# Patient Record
Sex: FEMALE | Race: WHITE | NOT HISPANIC OR LATINO | Employment: OTHER | URBAN - NONMETROPOLITAN AREA
[De-identification: names, ages, dates, MRNs, and addresses within clinical notes are randomized per-mention and may not be internally consistent; named-entity substitution may affect disease eponyms.]

---

## 2017-12-15 ENCOUNTER — HISTORY (OUTPATIENT)
Dept: EMERGENCY MEDICINE | Facility: OTHER | Age: 52
End: 2017-12-15

## 2017-12-15 ENCOUNTER — SURGERY (OUTPATIENT)
Dept: SURGERY | Facility: OTHER | Age: 52
End: 2017-12-15

## 2017-12-15 ENCOUNTER — HOSPITAL ENCOUNTER (OUTPATIENT)
Dept: MEDSURG UNIT | Facility: OTHER | Age: 52
Discharge: HOME | End: 2017-12-16

## 2017-12-15 DIAGNOSIS — K36 OTHER APPENDICITIS: ICD-10-CM

## 2017-12-15 LAB
A/G RATIO - HISTORICAL: 1.2 (ref 1–2)
ABSOLUTE BASOPHILS - HISTORICAL: 0 THOU/CU MM
ABSOLUTE EOSINOPHILS - HISTORICAL: 0.1 THOU/CU MM
ABSOLUTE IMMATURE GRANULOCYTES(METAS,MYELOS,PROS) - HISTORICAL: 0 THOU/CU MM
ABSOLUTE LYMPHOCYTES - HISTORICAL: 0.9 THOU/CU MM (ref 0.9–2.9)
ABSOLUTE MONOCYTES - HISTORICAL: 0.6 THOU/CU MM
ABSOLUTE NEUTROPHILS - HISTORICAL: 6.7 THOU/CU MM (ref 1.7–7)
ALBUMIN SERPL-MCNC: 4.1 G/DL (ref 3.5–5.7)
ALP SERPL-CCNC: 105 IU/L (ref 34–104)
ALT (SGPT) - HISTORICAL: 64 IU/L (ref 7–52)
ANION GAP - HISTORICAL: 8 (ref 5–18)
AST SERPL-CCNC: 33 IU/L (ref 13–39)
BASOPHILS # BLD AUTO: 0.4 %
BILIRUB SERPL-MCNC: 0.4 MG/DL (ref 0.3–1)
BILIRUB UR QL: NEGATIVE
BUN SERPL-MCNC: 9 MG/DL (ref 7–25)
BUN/CREAT RATIO - HISTORICAL: 9
CALCIUM SERPL-MCNC: 9.2 MG/DL (ref 8.6–10.3)
CHLORIDE SERPLBLD-SCNC: 103 MMOL/L (ref 98–107)
CLARITY, URINE: CLEAR CLARITY
CO2 SERPL-SCNC: 27 MMOL/L (ref 21–31)
COLOR UR: YELLOW COLOR
CREAT SERPL-MCNC: 0.98 MG/DL (ref 0.7–1.3)
EOSINOPHIL NFR BLD AUTO: 1.1 %
ERYTHROCYTE [DISTWIDTH] IN BLOOD BY AUTOMATED COUNT: 13.8 % (ref 11.5–15.5)
GFR IF NOT AFRICAN AMERICAN - HISTORICAL: 59 ML/MIN/1.73M2
GLOBULIN - HISTORICAL: 3.5 G/DL (ref 2–3.7)
GLUCOSE SERPL-MCNC: 114 MG/DL (ref 70–105)
GLUCOSE URINE: NEGATIVE MG/DL
HCG UR QL: NEGATIVE
HCT VFR BLD AUTO: 42.5 % (ref 33–51)
HEMOGLOBIN: 13.9 G/DL (ref 12–16)
IMMATURE GRANULOCYTES(METAS,MYELOS,PROS) - HISTORICAL: 0.2 %
KETONES UR QL: NEGATIVE MG/DL
LEUKOCYTE ESTERASE URINE: NEGATIVE
LIPASE SERPL-CCNC: 27.7 IU/L (ref 11–82)
LYMPHOCYTES NFR BLD AUTO: 10.7 % (ref 20–44)
MCH RBC QN AUTO: 29.5 PG (ref 26–34)
MCHC RBC AUTO-ENTMCNC: 32.7 G/DL (ref 32–36)
MCV RBC AUTO: 90 FL (ref 80–100)
MONOCYTES NFR BLD AUTO: 7.4 %
NEUTROPHILS NFR BLD AUTO: 80.2 % (ref 42–72)
NITRITE UR QL STRIP: NEGATIVE
OCCULT BLOOD,URINE - HISTORICAL: NEGATIVE
PH UR: 8 [PH]
PLATELET # BLD AUTO: 232 THOU/CU MM (ref 140–440)
PMV BLD: 9.9 FL (ref 6.5–11)
POTASSIUM SERPL-SCNC: 4.1 MMOL/L (ref 3.5–5.1)
PROT SERPL-MCNC: 7.6 G/DL (ref 6.4–8.9)
PROTEIN QUALITATIVE,URINE - HISTORICAL: NEGATIVE MG/DL
RED BLOOD COUNT - HISTORICAL: 4.71 MIL/CU MM (ref 4–5.2)
SODIUM SERPL-SCNC: 138 MMOL/L (ref 133–143)
SP GR UR STRIP: 1.01
UROBILINOGEN,QUALITATIVE - HISTORICAL: NORMAL EU/DL
WHITE BLOOD COUNT - HISTORICAL: 8.3 THOU/CU MM (ref 4.5–11)

## 2017-12-27 ENCOUNTER — COMMUNICATION - GICH (OUTPATIENT)
Dept: SURGERY | Facility: OTHER | Age: 52
End: 2017-12-27

## 2018-02-12 NOTE — TELEPHONE ENCOUNTER
"Patient Information     Patient Name MRN Sex Ally Bustillos [id number] Female 1965      Telephone Encounter by Anjel Gonzalez at 2017  1:27 PM     Author:  Anjel Gonzalez Service:  (none) Author Type:  (none)     Filed:  2017  1:30 PM Encounter Date:  2017 Status:  Signed     :  Anjel Gonzalez            Please notify patient that her pathology report stated: \"Appendicitis\". Per surgeons request, if patient was seen by her primary provider and is having no further complications, she does not need to come back in for a post-op.     Anjel Gonzalez ....................  2017   1:29 PM          "

## 2018-02-12 NOTE — ED NOTES
Patient Information     Patient Name MRN Sex Ally Bustillos [id number] Female 1965      ED Nursing Note by Jose Angel Pandey RN at 12/15/2017  4:35 PM     Author:  Jose Angel Pandey RN Service:  (none) Author Type:  NURS- Registered Nurse     Filed:  12/15/2017  5:16 PM Date of Service:  12/15/2017  4:35 PM Status:  Signed     :  Jose Angel Pandey RN (NURS- Registered Nurse)            Resting in room, provider in to see patient.

## 2018-02-12 NOTE — ED NOTES
"Patient Information     Patient Name MRN Ally Wilkins [id number] Female 1965      ED Nursing Note by Jose Angel Pandey RN at 12/15/2017  3:41 PM     Author:  Jose Angel Pandey RN Service:  (none) Author Type:  NURS- Registered Nurse     Filed:  12/15/2017  3:43 PM Date of Service:  12/15/2017  3:41 PM Status:  Signed     :  Jose Angel Pandey RN (NURS- Registered Nurse)            States this morning while getting ready for her [facility], states her \"nerve pain\" started really bad, pointing to right groin. Then the pain became worse as the day went on. Had an episode of chills this afternoon. States she is on disability from her \"nerve pain\".         "

## 2018-02-12 NOTE — ED PROVIDER NOTES
Patient Information     Patient Name MRN Sex Ally Bustillos [id number] Female 1965      ED Provider Note by Munir Hernández MD at 12/15/2017  7:21 PM     Author:  Munir Hernández MD Service:  (none) Author Type:  Physician     Filed:  12/15/2017  7:23 PM Date of Service:  12/15/2017  7:21 PM Status:  Signed     :  Munir Hernández MD (Physician)            PATIENT:  Ally Rodas       52 y.o.       female       MRN #:  [id number]    CHIEF COMPLAINT:  Pain In Side and Musculoskeletal Problem      HPI 52y with right middle and upper quadrant pain for 2 days, getting worse.  Eating seems to make it worse.      No fevers, although she has a measured temp here in the ER.    No past surgical abdominal hx that she reports.      No nausea, vomiting, diarrhea.    No urinary symptoms.    ALLERGIES:  Review of patient's allergies indicates no known allergies.    CURRENT MEDICATIONS:    No current outpatient prescriptions on file.  PROBLEM LIST:       DEPRESSION, HX OF      FATIGUE      DEPRESSION      OSTEOPENIA      PELVIC  PAIN        PAST MEDICAL HISTORY:    Past Medical History:     Diagnosis  Date     Broken shoulder      Fracture of toe      Fracture of wrist      History of pregnancy      PAST SURGICAL HISTORY:    Past Surgical History:      Procedure  Laterality Date     [place] teeth extraction       FAMILY HISTORY:    Family History       Problem   Relation Age of Onset     Cancer-colon  Father      Heart Disease  Father      CABG       Diabetes  Paternal Aunt      X 2       Psychiatric illness  Brother      depression       Thyroid Disease  No Family History      SOCIAL HISTORY:  Marital Status:   [2]  Employment Status:  Not Employed [3]   Occupation:    Substance Use:  Smoking status: Never Smoker                                                              Smokeless status: Never Used                           Review of Systems no fevers, chills, HEENT,  "SOB, chest pain    Patient Vitals for the past 24 hrs:   BP Temp Pulse Resp SpO2 Height Weight   12/15/17 1700 (!) 143/110 - - - 99 % - -   12/15/17 1645 135/89 - - - 96 % - -   12/15/17 1543 (!) 143/103 99.9  F (37.7  C) (!) 106 18 97 % 1.651 m (5' 5\") 136.1 kg (300 lb)      Physical Exam   Well woman with stable vitals.  OP clear.  Heart reg.  Lungs clear.  Abd tender in right middle and upper quadrant.  Equivocal McBurney's point.  Otherwise no surgical signs like involuntary guarding or rebound.  NABS.  ECG:      IMAGING:  Acute appendicitis    LABORATORY:  reassuring    ED COURSE:  ED Course     She is given dilaudid 0.5mg IV x 2    IMPRESSION and PLAN:  Will admit under 's service for operative and post-operative care.      ENCOUNTER DIAGNOSES:  ENCOUNTER DIAGNOSES        ICD-10-CM    1. Other appendicitis K36        Coding    Munir Hernández MD  DOS: 12/15/2017   [facility]          "

## 2018-02-12 NOTE — OR ANESTHESIA
Patient Information     Patient Name MRN Sex     Ally Rodas [id number] Female 1965      OR Anesthesia by Regino Javed CRNA at 12/15/2017  7:32 PM     Author:  Regino Javed CRNA Service:  (none) Author Type:  NURS- Nurse Anesthetist     Filed:  12/15/2017  7:32 PM Date of Service:  12/15/2017  7:32 PM Status:  Signed     :  Regino Javed CRNA (NURS- Nurse Anesthetist)                                                           ANESTHESIA ASSESSMENT    Date: 12/15/17 Time: 7:32 PM      Patient:  Ally Rodas    * No surgery found *    Past Medical History:     Diagnosis  Date     Broken shoulder     left      Fracture of toe      Fracture of wrist     left      History of pregnancy      2, para 2 (vaginal childbirths)        Past Surgical History:      Procedure  Laterality Date     [place] TEETH EXTRACTION         Family History       Problem   Relation Age of Onset     Cancer-colon  Father      Heart Disease  Father      CABG       Diabetes  Paternal Aunt      X 2       Psychiatric illness  Brother      depression       Thyroid Disease  No Family History        Patient Active Problem List     Diagnosis  Code     FATIGUE R53.81, R53.83     DEPRESSION F32.9     OSTEOPENIA M89.9, M94.9     PELVIC  PAIN R10.9     DEPRESSION, HX OF Z86.59         (Not in a hospital admission)    Allergies:No Known Allergies    Review of Systems:  GERD: No  Chest pain: No  Shortness of breath: No  Recent fever: Yes (started today)  Poor exercise tolerance: No  Bleeding tendency: No  Pregnant: No  Anesthesia Complications: None      History    Smoking Status      Never Smoker   Smokeless Tobacco      Never Used     Social History     Social History        Marital status:       Spouse name: N/A     Number of children:  N/A     Years of education:  N/A     Social History Main Topics       Smoking status: Never Smoker     Smokeless tobacco: Never Used     Alcohol use None     Drug  "use: None     Sexual activity: Not Asked     Other Topics  Concern     None      Social History Narrative     Derek ~ 2 children       Physical Examination:  BP (!) 143/110  Pulse (!) 106  Temp 99.9  F (37.7  C)  Resp 18  Ht 1.651 m (5' 5\")  Wt 136.1 kg (300 lb)  SpO2 99%  BMI 49.92 kg/m2 Body mass index is 49.92 kg/(m^2). Body surface area is 2.5 meters squared.  Dental Condition: Good     Mallampati Score (Airway): I  Cardiovascular: Normal  Pulmonary: Normal  Other: (not recorded)    Recent Labs in [facility]:    Recent Labs        12/15/17   1633  12/15/17   1620   SODIUM   --   138   POTASSIUM   --   4.1   CHLORIDE   --   103   DH4IVMNY   --   27   ANIONGAP   --   8   BUN   --   9   CREATININE   --   0.98   BUNCREARATIO   --   9   CALCIUM   --   9.2   GLUCOSE   --   114 H   WBC   --   8.3   HGB   --   13.9   HCT   --   42.5   PLT   --   232   PREGURINE  Negative   --              Assessment/Plan:  ASA Class: II  Risk of dental injury discussed: Yes  NPO status confirmed: Yes (greater than 8 hrs)  Anesthetic Plan:  General  Risk/Benefit/Alt discussed: Yes  Questions answered: Yes  Emergency Case?: Yes  Labs/ECG/Radiology Reviewed?: Yes      H&P Reviewed.  Patient Examined.      Provider Electronic Signature:  Regino Javed CRNA                "

## 2018-02-12 NOTE — ED NOTES
Patient Information     Patient Name MRN Sex Ally Bustillos [id number] Female 1965      ED Nursing Note by Jose Angel Pandey RN at 12/15/2017  5:39 PM     Author:  Jose Angel Pandey RN Service:  (none) Author Type:  NURS- Registered Nurse     Filed:  12/15/2017  5:39 PM Date of Service:  12/15/2017  5:39 PM Status:  Signed     :  Jose Angel Pandey RN (NURS- Registered Nurse)            Pain level down to 2/10.

## 2018-02-12 NOTE — PROGRESS NOTES
Patient Information     Patient Name MRN Sex     Ally Rodas [id number] Female 1965      Progress Notes by Patricia Bagley RN at 12/15/2017 10:50 PM     Author:  Patricia Bagley RN Service:  (none) Author Type:  NURS- Registered Nurse     Filed:  12/15/2017 11:24 PM Date of Service:  12/15/2017 10:50 PM Status:  Signed     :  Patricia Bagley RN (NURS- Registered Nurse)            Admission Note    Data:  Ally Rodas admitted to [facility] from PACU via cart.      Action:  Family and Dr. Gonzalez have been notified of admission.      Response:  Patient tolerated transfer. and Patient is stable. Patient on 2L of oxygen. Reports 6/10 surgical site pain. Drowsy. Daughter, Connie, here during admission and will stay the night. Oriented patient to room, call light, and bed controls.  D/C goal: Have pain controlled.    Patricia Bagley RN ....................  12/15/2017   11:23 PM

## 2018-02-12 NOTE — ED NOTES
Patient Information     Patient Name MRN Sex Ally Bustillos [id number] Female 1965      ED Nursing Note by Jose Angel Pandey RN at 12/15/2017  4:04 PM     Author:  Jose Angel Pandey RN Service:  (none) Author Type:  NURS- Registered Nurse     Filed:  12/15/2017  4:07 PM Date of Service:  12/15/2017  4:04 PM Status:  Signed     :  Jose Angel Pandey RN (NURS- Registered Nurse)            ED Nursing Assessment  ________________________________    GENERAL APPEARANCE:   Anxious  EXTREMITIES/SKIN:   No apparent problem  NEUROLOGIC:   Orientation/LOC status: (A) alert and oriented to person, place and date/time  RESPIRATORY:   Breath Sounds are clear  CARDIAC:   No apparent problem  ABDOMEN/GI:   Contour of abdomen is soft, Bowel sounds are normal, Pain present in LUQ and RUQ, and tender on palpation  GENITOURINARY:   No problems  REPRODUCTIVE:   No apparent problem  MUSCULOSKELETAL:     EENT:

## 2018-02-12 NOTE — TELEPHONE ENCOUNTER
Patient Information     Patient Name MRN Sex Ally Bustillos [id number] Female 1965      Telephone Encounter by Anjel Gonzalez at 2017  1:29 PM     Author:  Ajnel Gonzalez Service:  (none) Author Type:  (none)     Filed:  2017  1:30 PM Encounter Date:  2017 Status:  Signed     :  Anjel Gonzalez            Left message to call back  ...................Anjel Gonzalez LPN....2017 1:29 PM

## 2018-02-12 NOTE — PROGRESS NOTES
Patient Information     Patient Name MRN Sex Ally Bustillos [id number] Female 1965      Progress Notes by Crys Kim RN at 2017  8:58 AM     Author:  Crys Kim RN Service:  (none) Author Type:  NURS- Registered Nurse     Filed:  2017  8:58 AM Date of Service:  2017  8:58 AM Status:  Signed     :  Crys Kim RN (NURS- Registered Nurse)            Patient tolerated soft surgical diet this morning. Independent in room. Voiding without difficulties. Crys Kim RN ....................  2017   8:58 AM

## 2018-02-12 NOTE — PROGRESS NOTES
Patient Information     Patient Name MRN Sex     Ally Rodas [id number] Female 1965      Progress Notes by Crys Kim RN at 2017  9:19 AM     Author:  Crys Kim RN Service:  (none) Author Type:  NURS- Registered Nurse     Filed:  2017  9:22 AM Date of Service:  2017  9:19 AM Status:  Signed     :  Crys Kim RN (NURS- Registered Nurse)            Discharge Note    Data:  Ally Rodas has been discharged home at 0915 via wheelchair accompanied by Nursing Assistant.      Action:  Written discharge/follow-up instructions were provided to patient. Prescriptions were written and sent with patient and were sent to patients pharmacy.  Belongings sent with patient. Medications from home sent with patient/family: Not Applicable  Equipment none .     Response:  Patient verbalized understanding of discharge instructions, reason for discharge, and necessary follow-up appointments.     Crys Kim RN ....................  2017   9:21 AM

## 2018-02-12 NOTE — OR POSTOP
Patient Information     Patient Name MRN Sex     Ally Rodas [id number] Female 1965      OR PostOp by Aletha Aguila RN at 12/15/2017 10:33 PM     Author:  Aletha Aguila RN Service:  (none) Author Type:  NURS- Registered Nurse     Filed:  12/15/2017 10:35 PM Date of Service:  12/15/2017 10:33 PM Status:  Signed     :  Aletha Aguila RN (NURS- Registered Nurse)            PACU Transfer Note    Ally Rodas transferred to [facility] via cart.  Equipment used for transport:  Oxygen, Nasal Cannula.  Accompanied by:  Registered Nurse. Report given to Patricia MEZA.    Patient stable and meets phase 1 discharge criteria for transport from PACU.  PACU Respiratory Event Documentation     1) Episodes of Apnea greater than or equal to 10 seconds: No    2) Bradypnea - less than 8 breaths per minute: No    3) Pain score on 0 to 10 scale: 0    4) Pain-sedation mismatch (yes or no): No    5) Repeated 02 desaturation less than 90% (yes or no): O2 on 2 l via NC.    Anesthesia notified? (yes or no): No    Any of the above events occuring repeatedly in separate 30 minute intervals may be considered recurrent PACU respiratory events.

## 2018-02-12 NOTE — ED NOTES
Patient Information     Patient Name MRN Sex Ally Bustillos [id number] Female 1965      ED Nursing Note by Jose Angel Pandey RN at 12/15/2017  6:15 PM     Author:  Jose Angel Pandey RN Service:  (none) Author Type:  NURS- Registered Nurse     Filed:  12/15/2017  8:09 PM Date of Service:  12/15/2017  6:15 PM Status:  Signed     :  Jose Angel Pandey RN (NURS- Registered Nurse)            Resting in bed, pain contrileed

## 2018-02-12 NOTE — OR SURGEON
Patient Information     Patient Name MRN Sex Ally Bustillos [id number] Female 1965      OR Surgeon by Cesar Gonzalez MD at 12/15/2017  9:32 PM     Author:  Cesar Gonzalez MD Service:  (none) Author Type:  Physician     Filed:  12/15/2017  9:37 PM Date of Service:  12/15/2017  9:32 PM Status:  Signed     :  Cesar Gonzalez MD (Physician)            Preoperative Diagnosis: Acute appendicitis     Postoperative Diagnosis: Acute appendicitis with localized peritonitis    Procedure planned: Laparoscopic appendectomy     Procedure performed: Laparoscopic appendectomy     Surgeon: Cesar Gonzalez MD    Assistant: Cristi Dyer CST  Circulator: Jackie Dai RN  PACU Nurse: Aletha Aguila RN  Scrub: Kenzie Caban  Scrub Orientee: Romina Cardenas, Surgical Technician    Anesthesia: General endotracheal with local    Specimen: appendix     Estimated Blood Loss: less than 10 ml     INDICATIONS   Please see H&P. The patient had acute onset of Right mid abdominal pain.  WBC is normal and CT scan showed changes consistent with acute appendicitis. The risks, benefits and alternatives to laparoscopic appendectomy for treating acute appendicitiis were discussed with the patient. We specifically discussed the risks of infection, bleeding, injury to abdominal organs and the possible need for open procedure. The patient expressed understanding and questions were answered. Informed consent paperwork was completed.     DESCRIPTION OF PROCEDURE   The patient was brought to the operating room and placed in a supine position on the operating table. Appropriate monitors were attached. The patient received IV antibiotics preoperatively. After general anesthesia was induced, the patient was positioned, prepped and draped in the standard fashion. Time out was performed confirming the patient's identity and procedure to be performed.   Local anesthetic was infiltrated in the skin and  subcutaneous tissue just below the umbilicus.  A 5 mm incision was made. The fascia was grasped and elevated with kocher clamp. The Veress needle was inserted into the peritoneal cavity and placement confirmed using saline test. CO2 was then used to establish pneumoperitoneum. Trocar and camera were inserted without difficulty. The contents of the abdomen were inspected. No evidence of injury was noted on inspection. Local anesthetic was infiltrated in the mid lower abdomen and the left lower abdomen. Skin incisions were made and under direct visualization trocars were positioned. The cecum was grasped and elevated. The appendix was identified and elevated. The base of the appendix was identified and grasped. Adhesions were taken down freeing the appendix to the tip. A window was created in the mesentery at the base of the appendix. The GI laparoscopic stapler was placed across the appendix and closed. The small bowel was inspected, no narrowing was noted. The stapler was fired. The staple line was inspected and there was excellent hemostasis. A vascular load was placed in the stapler and the staple positioned across the mesoappendix. The stapler was closed and fired. The staple line had excellent hemostasis. The appendix was placed in the retrieval bag and removed from the abdomen through the left lower abdomen port. The staple lines were irrigated and the irrigation suctioned. Hemostasis was excellent. No evidence of leak was noted. There were mucinous deposits free and attached in the abdomen.  These were included with the specimen.  The pneumoperitoneum was then reduced and trocars removed from the abdomen. Further local anesthetic was infiltrated at each incision for postop pain control.  Deep tissues at the left lower abdomen were approximated using 0 Vicryl suture. Skin edges were approximated using interrupted and running Monocryl suture. Sterile dressings were applied. The patient was then awakened from  anesthesia and taken to postanesthesia recovery in stable condition. All needle, sponge and instrument counts were reported as correct at the conclusion of the case. The patient tolerated the procedure with no immediately apparent complications.     Cesar Gonzalez MD ....................  12/15/2017   9:33 PM

## 2018-02-12 NOTE — TELEPHONE ENCOUNTER
Patient Information     Patient Name MRN Sex Ally Bustillos [id number] Female 1965      Telephone Encounter by Anjel Gonzalez at 2017  1:41 PM     Author:  Anjel Gonzalez Service:  (none) Author Type:  (none)     Filed:  2017  1:43 PM Encounter Date:  2017 Status:  Signed     :  Anjel Gonzalez            After birth date was verified, spoke with patient and let her know the below information. Patient stated that she has not had any further complications or concerns related to the appendicitis removal. Patient with no further questions or concerns.    Anjel Gonzalez ....................  2017   1:43 PM

## 2018-02-12 NOTE — ED NOTES
Patient Information     Patient Name MRN Sex Ally Bustillos [id number] Female 1965      ED Nursing Note by Jose Angel Pandey RN at 12/15/2017  5:17 PM     Author:  Jose Angel Pandey RN Service:  (none) Author Type:  NURS- Registered Nurse     Filed:  12/15/2017  5:17 PM Date of Service:  12/15/2017  5:17 PM Status:  Signed     :  Jose Angel Pandey RN (NURS- Registered Nurse)            States pain increased to 4-5/10.

## 2018-02-12 NOTE — OR ANESTHESIA
Patient Information     Patient Name MRN Sex     Ally Rodas [id number] Female 1965      OR Anesthesia by Regino Javed CRNA at 12/15/2017 10:29 PM     Author:  Regino Javed CRNA Service:  (none) Author Type:  NURS- Nurse Anesthetist     Filed:  12/15/2017 10:29 PM Date of Service:  12/15/2017 10:29 PM Status:  Signed     :  Regino Javed CRNA (NURS- Nurse Anesthetist)            Anesthesia Post Operative Care Note    Name: Ally Rodas  MRN:   [id number]  :    1965       Procedure Done:  See Surgeon Note   Case Cancelled for Anesthetic Reason:  No     Post Op Considerations:  TESHA       TESHA Recommendations:  Suspected    Anesthesia Technique    Anesthetic Type:  General       Airway Management:  ET Tube         Intubation:  Easy     Oral Trauma:  No    Intraoperative Course   Hemodynamics:  Stable    Ventilation Normal:  Yes Lung Sounds:  Normal      PACU Course    Airway Status:  Extubated     Nondepolarizer Used: Yes        Reversed: Yes    Reintubation:  No   Hemodynamics:  Stable      Hydration: Euvolemic   Temperature:  36.1 - 38.3      Mental Status:  Awake, alert, follows commands   Pain Management:  Adequate   Regional Block:  No   Anesthesia Complications:  None      Vital Signs:  Temp: 98.4  F (36.9  C)  Pulse: 94  BP: 127/82  Resp: 18  SpO2: 90 %    O2 Device: Nasal Cannula         Level of Nausea: None        Active Lines:  Patient Lines/Drains/Airways Status    Active Line     Name: Placement date: Placement time: Site: Days:    PERIPHERAL VAD Right Upper Arm 20 12/15/17      Upper Arm   less than 1                Intake & Output:       Labs:  No results for input(s): KM8UQETQRQF, JIS3CTSNOTVG, PHARTERIAL, KUQ0OGQUYFDR, U4AZREAAAMGT in the last 24 hours.    No results for input(s): MAGNESIUM in the last 24 hours.    No results for input(s): GLUCOSEMETER in the last 720 hours.        Regino Javed CRNA ....................  12/15/2017   10:29  PM

## 2018-02-12 NOTE — PROGRESS NOTES
Patient Information     Patient Name MRN Sex Ally Bustillos [id number] Female 1965      Progress Notes by Patricia Bagley RN at 2017  6:35 AM     Author:  Patricia Bagley RN Service:  (none) Author Type:  NURS- Registered Nurse     Filed:  2017  6:41 AM Date of Service:  2017  6:35 AM Status:  Signed     :  Patricia Bagley RN (NURS- Registered Nurse)            Care Plan Progress Note    Data: VSS. Pain controlled with oral pain medication. Denies nausea. Tolerated juice and chicken broth. Voiding without difficulty. Patient is on oxygen only because she has sleep apnea and does not have CPAP here. She requested it be placed on 3L.     Action: Patient received Dilaudid initially then switched to oral percocet. Will wean patient off oxygen in the AM.     Response: Patient doing well and plans to discharge today. Daughter is here and will take patient home. Patient meets discharge criteria. Patient's D/C goal: have pain under control.    Patricia Bagley RN ....................  2017   6:41 AM

## 2018-02-12 NOTE — ED NOTES
Patient Information     Patient Name MRN Sex Ally Bustillos [id number] Female 1965      ED Nursing Note by Jose Angel Pandey RN at 12/15/2017  8:10 PM     Author:  Jose Angel Pandey RN Service:  (none) Author Type:  NURS- Registered Nurse     Filed:  12/15/2017  8:11 PM Date of Service:  12/15/2017  8:10 PM Status:  Signed     :  Jose Angel Pandey RN (NURS- Registered Nurse)            Report given to OR RN. To OR.

## 2018-02-12 NOTE — ED NOTES
Patient Information     Patient Name MRN Sex Ally Bustillos [id number] Female 1965      ED Nursing Note by Jose Angel Pandey RN at 12/15/2017  7:15 PM     Author:  Jose Angel Pandey RN Service:  (none) Author Type:  NURS- Registered Nurse     Filed:  12/15/2017  8:06 PM Date of Service:  12/15/2017  7:15 PM Status:  Signed     :  Jose Angel Pandey RN (NURS- Registered Nurse)            Provider in to see patient. To discuss treatment.

## 2018-02-12 NOTE — ED NOTES
Patient Information     Patient Name MRN Sex Ally Bustillos [id number] Female 1965      ED Nursing Note by Jose Angel Pandey RN at 12/15/2017  4:48 PM     Author:  Jose Angel Pandey RN Service:  (none) Author Type:  NURS- Registered Nurse     Filed:  12/15/2017  5:16 PM Date of Service:  12/15/2017  4:48 PM Status:  Signed     :  Jose Angel Pandey RN (NURS- Registered Nurse)            States pain 4/10.

## 2018-02-12 NOTE — CONSULTS
"Patient Information     Patient Name MRN Sex Ally Maier [id number] Female 1965      Consults by Cesar Gonzalez MD at 12/15/2017  7:38 PM     Author:  Cesar Gonzalez MD Service:  (none) Author Type:  Physician     Filed:  12/15/2017  7:41 PM Date of Service:  12/15/2017  7:38 PM Status:  Signed     :  Cesar Gonzalez MD (Physician)            General Surgery Consultation    HPI:  Ally Rodas began having mid abdominal pain yesterday which then localized to the right mid abdomen.  Positive nausea and vomiting, no significant fevers or chills.  No previous episodes, and no sick contacts.  Pain has become more severe and the patient presented to the emergency room.  No history of Crohns or ulcerative colitis.  No history of peptic ulcer disease or kidney stones.  No bleeding disorders.  Last bowel movement was yesterday, fairly normal.  No blood in the stool, emesis, or urine.      REVIEW OF SYSTEMS  GENERAL: No fevers or chills. Denies fatigue, recent weight loss.  HEENT: No sinus drainage. No changes with vision or hearing. No difficulty swallowing.   LYMPHATICS:  No swollen nodes in axilla, neck or groin.  CARDIOVASCULAR: Denies chest pain, palpitations and dyspnea on exertion.  PULMONARY: No shortness of breath or cough. No increase in sputum production.  GI: Denies melena, bright red blood in stools. No hematemesis. No constipation or diarrhea.  : No dysuria or hematuria.  SKIN: No recent rashes or ulcers.   HEMATOLOGY:  No history of easy bruising or bleeding.  ENDOCRINE:  No history of diabetes or thyroid problems.  NEUROLOGY:  No history of seizures or headaches. No motor or sensory changes. ++ Neruopathy        Exam:  BP (!) 143/110  Pulse (!) 106  Temp 99.9  F (37.7  C)  Resp 18  Ht 1.651 m (5' 5\")  Wt 136.1 kg (300 lb)  SpO2 99%  BMI 49.92 kg/m2  General: No acute distress. Pleasant and cooperative with exam and interview.  HEENT: Head: Normocephalic, " atraumatic. Eyes: PERRLA, EOMI. No icterus. Nose: no nasal drainage. No lesions. Mouth: mucus membranes are pink and moist. No lesions.  Neck: trachea mid-line. No thyroid nodules.   Lymphatics: no adenopathy cervical, supraclavicular or axillary nodes.  Lungs: clear to auscultation, no respiratory distress.  Heart: regular, no murmur, no extremity edema.  Abdomen: Obese, positive bowel sounds, soft. No organomegaly. No hernia. Tender in right mid abdomen with palpation.  Skin: pink, warm and dry with no rash.  Psych: awake, alert and oriented x3. Affect appropriate.    12/15/2017  WBC: 8.3     12/15/2017  RBC: 4.71  12/15/2017  HGB: 13.9  12/15/2017  HCT: 42.5   12/15/2017  MCV: 90    12/15/2017  MCH: 29.5   12/15/2017 MCHC: 32.7   12/15/2017  RDW: 13.8   12/15/2017  PLT: 232    12/15/2017  Neutrophils: 80.2  12/15/2017  Lymphs: 10.7  12/15/2017  Monos: 7.4      SODIUM      Date Value Ref Range Status   12/15/2017 138 133 - 143 mmol/L Final     POTASSIUM      Date Value Ref Range Status   12/15/2017 4.1 3.5 - 5.1 mmol/L Final     CHLORIDE      Date Value Ref Range Status   12/15/2017 103 98 - 107 mmol/L Final     CO2,TOTAL      Date Value Ref Range Status   12/15/2017 27 21 - 31 mmol/L Final     ANION GAP      Date Value Ref Range Status   12/15/2017 8 5 - 18                 Final     GLUCOSE      Date Value Ref Range Status   12/15/2017 114 (H) 70 - 105 mg/dL Final     BUN      Date Value Ref Range Status   12/15/2017 9 7 - 25 mg/dL Final     CREATININE      Date Value Ref Range Status   12/15/2017 0.98 0.70 - 1.30 mg/dL Final     BUN/CREAT RATIO                Date Value Ref Range Status   12/15/2017 9                 Final     CALCIUM      Date Value Ref Range Status   12/15/2017 9.2 8.6 - 10.3 mg/dL Final       Assessment:  Right mid abdominal pain.  Based on history and physical examination, labs, and CT scan images and report, most consistent with appendicitis.      Plan:  NPO/IVF/IV Abx  Pt to operating room  urgently for appendectomy.    Discussed options laparoscopic appendectomy, possible open vs medical management.    The patient and family were explained risks and benefits of the procedure including but not limited to bleeding and possible infection, possible conversion to open procedure and possible development of a wound infection or post operative abscess requiring drainage.  Also that the appendix will be removed even if it appears normal.  Also possible damage to the bowel or bladder or surrounding tissues. They appeared to understand and wished to proceed.  Written informed consent paperwork was completed.    Case d/w Dr. Hernández in the       Cesar Gonzalez MD  General Surgery  7:38 PM 12/15/2017

## 2018-07-08 ENCOUNTER — HOSPITAL ENCOUNTER (EMERGENCY)
Facility: OTHER | Age: 53
Discharge: HOME | End: 2018-07-08
Payer: COMMERCIAL

## 2018-07-08 VITALS
OXYGEN SATURATION: 97 % | TEMPERATURE: 98.8 F | HEART RATE: 78 BPM | RESPIRATION RATE: 18 BRPM | HEIGHT: 65 IN | DIASTOLIC BLOOD PRESSURE: 86 MMHG | SYSTOLIC BLOOD PRESSURE: 144 MMHG | WEIGHT: 293 LBS | BODY MASS INDEX: 48.82 KG/M2

## 2018-07-08 DIAGNOSIS — M62.81 GENERALIZED MUSCLE WEAKNESS: ICD-10-CM

## 2018-07-08 LAB
ALBUMIN UR-MCNC: NEGATIVE MG/DL
ANION GAP SERPL CALCULATED.3IONS-SCNC: 7 MMOL/L (ref 3–14)
APPEARANCE UR: CLEAR
BASOPHILS # BLD AUTO: 0.1 10E9/L (ref 0–0.2)
BASOPHILS NFR BLD AUTO: 0.9 %
BILIRUB UR QL STRIP: NEGATIVE
BUN SERPL-MCNC: 10 MG/DL (ref 7–25)
CALCIUM SERPL-MCNC: 9 MG/DL (ref 8.6–10.3)
CHLORIDE SERPL-SCNC: 106 MMOL/L (ref 98–107)
CO2 SERPL-SCNC: 25 MMOL/L (ref 21–31)
COLOR UR AUTO: YELLOW
CREAT SERPL-MCNC: 0.91 MG/DL (ref 0.6–1.2)
DIFFERENTIAL METHOD BLD: NORMAL
EOSINOPHIL # BLD AUTO: 0.1 10E9/L (ref 0–0.7)
EOSINOPHIL NFR BLD AUTO: 1.9 %
ERYTHROCYTE [DISTWIDTH] IN BLOOD BY AUTOMATED COUNT: 13.8 % (ref 10–15)
GFR SERPL CREATININE-BSD FRML MDRD: 64 ML/MIN/1.7M2
GLUCOSE SERPL-MCNC: 110 MG/DL (ref 70–105)
GLUCOSE UR STRIP-MCNC: NEGATIVE MG/DL
HCT VFR BLD AUTO: 39.9 % (ref 35–47)
HGB BLD-MCNC: 13.1 G/DL (ref 11.7–15.7)
HGB UR QL STRIP: NEGATIVE
IMM GRANULOCYTES # BLD: 0 10E9/L (ref 0–0.4)
IMM GRANULOCYTES NFR BLD: 0.2 %
KETONES UR STRIP-MCNC: NEGATIVE MG/DL
LEUKOCYTE ESTERASE UR QL STRIP: NEGATIVE
LYMPHOCYTES # BLD AUTO: 1.4 10E9/L (ref 0.8–5.3)
LYMPHOCYTES NFR BLD AUTO: 26.2 %
MCH RBC QN AUTO: 29.8 PG (ref 26.5–33)
MCHC RBC AUTO-ENTMCNC: 32.8 G/DL (ref 31.5–36.5)
MCV RBC AUTO: 91 FL (ref 78–100)
MONOCYTES # BLD AUTO: 0.5 10E9/L (ref 0–1.3)
MONOCYTES NFR BLD AUTO: 9.1 %
NEUTROPHILS # BLD AUTO: 3.3 10E9/L (ref 1.6–8.3)
NEUTROPHILS NFR BLD AUTO: 61.7 %
NITRATE UR QL: NEGATIVE
PH UR STRIP: 6 PH (ref 5–7)
PLATELET # BLD AUTO: 231 10E9/L (ref 150–450)
POTASSIUM SERPL-SCNC: 3.7 MMOL/L (ref 3.5–5.1)
RBC # BLD AUTO: 4.4 10E12/L (ref 3.8–5.2)
SODIUM SERPL-SCNC: 138 MMOL/L (ref 134–144)
SOURCE: NORMAL
SP GR UR STRIP: <1.005 (ref 1–1.03)
UROBILINOGEN UR STRIP-ACNC: 0.2 EU/DL (ref 0.2–1)
WBC # BLD AUTO: 5.4 10E9/L (ref 4–11)

## 2018-07-08 PROCEDURE — 93010 ELECTROCARDIOGRAM REPORT: CPT | Performed by: INTERNAL MEDICINE

## 2018-07-08 PROCEDURE — 81003 URINALYSIS AUTO W/O SCOPE: CPT

## 2018-07-08 PROCEDURE — 93005 ELECTROCARDIOGRAM TRACING: CPT

## 2018-07-08 PROCEDURE — 85025 COMPLETE CBC W/AUTO DIFF WBC: CPT

## 2018-07-08 PROCEDURE — 96360 HYDRATION IV INFUSION INIT: CPT

## 2018-07-08 PROCEDURE — 96361 HYDRATE IV INFUSION ADD-ON: CPT

## 2018-07-08 PROCEDURE — 25000128 H RX IP 250 OP 636

## 2018-07-08 PROCEDURE — 99284 EMERGENCY DEPT VISIT MOD MDM: CPT | Mod: 25

## 2018-07-08 PROCEDURE — 99284 EMERGENCY DEPT VISIT MOD MDM: CPT | Mod: Z6

## 2018-07-08 PROCEDURE — 80048 BASIC METABOLIC PNL TOTAL CA: CPT

## 2018-07-08 PROCEDURE — 36415 COLL VENOUS BLD VENIPUNCTURE: CPT

## 2018-07-08 RX ORDER — SODIUM CHLORIDE 9 MG/ML
1000 INJECTION, SOLUTION INTRAVENOUS CONTINUOUS
Status: DISCONTINUED | OUTPATIENT
Start: 2018-07-08 | End: 2018-07-08 | Stop reason: HOSPADM

## 2018-07-08 RX ADMIN — SODIUM CHLORIDE 1000 ML: 900 INJECTION, SOLUTION INTRAVENOUS at 13:59

## 2018-07-08 ASSESSMENT — ENCOUNTER SYMPTOMS
FEVER: 0
ARTHRALGIAS: 0
WEAKNESS: 1
ACTIVITY CHANGE: 1
NECK PAIN: 0
DIARRHEA: 0
EYE PAIN: 0
FLANK PAIN: 0
PALPITATIONS: 0
FATIGUE: 1
SINUS PRESSURE: 0
BACK PAIN: 0
NECK STIFFNESS: 0
SHORTNESS OF BREATH: 0
COUGH: 0
DYSURIA: 0
MYALGIAS: 1
VOMITING: 0
SORE THROAT: 0
NAUSEA: 0
HEADACHES: 0
DIZZINESS: 0
EYE REDNESS: 0
CHEST TIGHTNESS: 0
APPETITE CHANGE: 0
JOINT SWELLING: 0
ABDOMINAL PAIN: 0
RHINORRHEA: 0

## 2018-07-08 NOTE — ED PROVIDER NOTES
History   No chief complaint on file.    The history is provided by the patient.     Ally Rodas is a 53 year old female who presents to the ED with concern for weakness and fatigue.  She states that she has had feelings of weakness and fatigue for the past couple of months.  She has also had numbness in her fingertips for the past year or so.  She has not seen her primary care provider for these concerns.  She denies fever, cough, change in appetite, other concerns or complaints.    Problem List:    There are no active problems to display for this patient.       Past Medical History:    Past Medical History:   Diagnosis Date     Closed fracture of carpal bone      Closed fracture of shoulder girdle      Closed fracture of toe      Personal history of other medical treatment (CODE)        Past Surgical History:    Past Surgical History:   Procedure Laterality Date     EXTRACTION(S) DENTAL      No Comments Provided       Family History:    Family History   Problem Relation Age of Onset     Colon Cancer Father      Cancer-colon     HEART DISEASE Father      Heart Disease,CABG     Diabetes Paternal Aunt      Diabetes,X 2     Other - See Comments Brother      Psychiatric illness,depression     Thyroid Disease No family hx of      Thyroid Disease       Social History:  Marital Status:   [4]  Social History   Substance Use Topics     Smoking status: Never Smoker     Smokeless tobacco: Never Used     Alcohol use No        Medications:      Cyanocobalamin (VITAMIN B 12 PO)   IBUPROFEN PO         Review of Systems   Constitutional: Positive for activity change and fatigue. Negative for appetite change and fever.   HENT: Negative for congestion, ear pain, rhinorrhea, sinus pressure and sore throat.    Eyes: Negative for pain and redness.   Respiratory: Negative for cough, chest tightness and shortness of breath.    Cardiovascular: Negative for chest pain and palpitations.   Gastrointestinal: Negative for  "abdominal pain, diarrhea, nausea and vomiting.   Genitourinary: Negative for dysuria, flank pain and pelvic pain.   Musculoskeletal: Positive for myalgias. Negative for arthralgias, back pain, joint swelling, neck pain and neck stiffness.   Skin: Negative.    Neurological: Positive for weakness. Negative for dizziness, syncope and headaches.       Physical Exam   BP: 143/74  Pulse: 78  Temp: 98.8  F (37.1  C)  Resp: 18  Height: 165.1 cm (5' 5\")  Weight: 136.1 kg (300 lb)  SpO2: 97 %      Physical Exam   Constitutional: She is oriented to person, place, and time. No distress.   HENT:   Head: Normocephalic and atraumatic.   Right Ear: External ear normal.   Left Ear: External ear normal.   Nose: Nose normal.   Mouth/Throat: Oropharynx is clear and moist. No oropharyngeal exudate.   Eyes: Conjunctivae and EOM are normal. Pupils are equal, round, and reactive to light.   Neck: Normal range of motion. Neck supple. No thyromegaly present.   Cardiovascular: Normal rate, regular rhythm and normal heart sounds.    No murmur heard.  Pulmonary/Chest: Effort normal and breath sounds normal. No respiratory distress.   Abdominal: Soft. Bowel sounds are normal. She exhibits no distension. There is no tenderness.   Musculoskeletal: Normal range of motion.   Neurological: She is alert and oriented to person, place, and time.   Skin: Skin is warm and dry.   Nursing note and vitals reviewed.      ED Course     ED Course   Screening labs, EKG, urinalysis.    Procedures               Critical Care time:  none               Results for orders placed or performed during the hospital encounter of 07/08/18 (from the past 24 hour(s))   CBC with platelets differential   Result Value Ref Range    WBC 5.4 4.0 - 11.0 10e9/L    RBC Count 4.40 3.8 - 5.2 10e12/L    Hemoglobin 13.1 11.7 - 15.7 g/dL    Hematocrit 39.9 35.0 - 47.0 %    MCV 91 78 - 100 fl    MCH 29.8 26.5 - 33.0 pg    MCHC 32.8 31.5 - 36.5 g/dL    RDW 13.8 10.0 - 15.0 %    Platelet " Count 231 150 - 450 10e9/L    Diff Method Automated Method     % Neutrophils 61.7 %    % Lymphocytes 26.2 %    % Monocytes 9.1 %    % Eosinophils 1.9 %    % Basophils 0.9 %    % Immature Granulocytes 0.2 %    Absolute Neutrophil 3.3 1.6 - 8.3 10e9/L    Absolute Lymphocytes 1.4 0.8 - 5.3 10e9/L    Absolute Monocytes 0.5 0.0 - 1.3 10e9/L    Absolute Eosinophils 0.1 0.0 - 0.7 10e9/L    Absolute Basophils 0.1 0.0 - 0.2 10e9/L    Abs Immature Granulocytes 0.0 0 - 0.4 10e9/L   Basic metabolic panel   Result Value Ref Range    Sodium 138 134 - 144 mmol/L    Potassium 3.7 3.5 - 5.1 mmol/L    Chloride 106 98 - 107 mmol/L    Carbon Dioxide 25 21 - 31 mmol/L    Anion Gap 7 3 - 14 mmol/L    Glucose 110 (H) 70 - 105 mg/dL    Urea Nitrogen 10 7 - 25 mg/dL    Creatinine 0.91 0.60 - 1.20 mg/dL    GFR Estimate 64 >60 mL/min/1.7m2    GFR Estimate If Black 78 >60 mL/min/1.7m2    Calcium 9.0 8.6 - 10.3 mg/dL   *UA reflex to Microscopic   Result Value Ref Range    Color Urine Yellow     Appearance Urine Clear     Glucose Urine Negative NEG^Negative mg/dL    Bilirubin Urine Negative NEG^Negative    Ketones Urine Negative NEG^Negative mg/dL    Specific Gravity Urine <1.005 1.003 - 1.035    Blood Urine Negative NEG^Negative    pH Urine 6.0 5.0 - 7.0 pH    Protein Albumin Urine Negative NEG^Negative mg/dL    Urobilinogen Urine 0.2 0.2 - 1.0 EU/dL    Nitrite Urine Negative NEG^Negative    Leukocyte Esterase Urine Negative NEG^Negative    Source Midstream Urine        Medications   0.9% sodium chloride BOLUS (0 mLs Intravenous Stopped 7/8/18 9821)     Followed by   sodium chloride 0.9% infusion (not administered)       Assessments & Plan (with Medical Decision Making)     I have reviewed the nursing notes.    I have reviewed the findings, diagnosis, plan and need for follow up with the patient.     This patient presents for evaluation of generalized weakness. Associated symptoms today have included screening lab works, EKG and UA.. The workup  here in the ED was to evaluate for infections, metabolic, electrolyte, neurologic or cardiovascular causes. Of note, there are no signs of pneumonia or UTI causing the symptoms. In addition, I do not believe symptoms are due to CVA, TIA, myasthesia gravis, myopathy or acute coronary syndromes and there are no indications at this point for a further workup with a benign history, exam and laboratory tests. They were ambulated in ED and did well. I believe supportive outpatient management is indicated; will have them followup with their primary care doctor in 1-2 days for a recheck. Return for any additional symptoms or worsening weakness.  New Prescriptions    No medications on file       Final diagnoses:   Generalized muscle weakness     Erma Landin RN, PhD, CNP  Nurse Practitioner  [facility] Emergency Department  7/8/2018   [facility]     Erma Landin APRN CNP  07/08/18 1544

## 2018-07-08 NOTE — ED AVS SNAPSHOT
[facility]    [address]    [place] [address]    Phone:  [phone]    Fax:  [phone]                                       Ally Rodas   MRN: [id number]    Department:  [facility]   Date of Visit:  7/8/2018           After Visit Summary Signature Page     I have received my discharge instructions, and my questions have been answered. I have discussed any challenges I see with this plan with the nurse or doctor.    ..........................................................................................................................................  Patient/Patient Representative Signature      ..........................................................................................................................................  Patient Representative Print Name and Relationship to Patient    ..................................................               ................................................  Date                                            Time    ..........................................................................................................................................  Reviewed by Signature/Title    ...................................................              ..............................................  Date                                                            Time

## 2018-07-08 NOTE — ED TRIAGE NOTES
Pt comes in with fatigue lasting 2 weeks. Pt reoprts she can hardly get out of bed. Pt denies body aches, pain, n/v. Pt reports numbness and swelling in bilateral hands.    Judy Quarles RN on 7/8/2018 at 1:33 PM

## 2018-07-08 NOTE — ED TRIAGE NOTES
COLUMBIA-SUICIDE SEVERITY RATING SCALE   Screen with Triage Points for Emergency Department      Ask questions that are bolded and underlined.   Past  month   Ask Questions 1 and 2 YES NO   1)  Have you wished you were dead or wished you could go to sleep and not wake up?   no   2)  Have you actually had any thoughts of killing yourself?   no   If YES to 2, ask questions 3, 4, 5, and 6.  If NO to 2, go directly to question 6.   3)  Have you been thinking about how you might do this?   E.g.  I thought about taking an overdose but I never made a specific plan as to when where or how I would actually do it .and I would never go through with it.       4)  Have you had these thoughts and had some intention of acting on them?   As opposed to  I have the thoughts but I definitely will not do anything about them.       5)  Have you started to work out or worked out the details of how to kill yourself? Do you intend to carry out this plan?      6)  Have you ever done anything, started to do anything, or prepared to do anything to end your life?  Examples: Collected pills, obtained a gun, gave away valuables, wrote a will or suicide note, took out pills but didn t swallow any, held a gun but changed your mind or it was grabbed from your hand, went to the roof but didn t jump; or actually took pills, tried to shoot yourself, cut yourself, tried to hang yourself, etc.    If YES, ask: Was this within the past three months?  Lifetime     No      Past 3 Months        Item 1:  Behavioral Health Referral at Discharge  Item 2:  Behavioral Health Referral at Discharge   Item 3:  Behavioral Health Consult (Psychiatric Nurse/) and consider Patient Safety Precautions  Item 4:  Immediate Notification of Physician and/or Behavioral Health and Patient Safety Precautions   Item 5:  Immediate Notification of Physician and/or Behavioral Health and Patient Safety Precautions  Item 6:  Over 3 months ago: Behavioral Health Consult  (Psychiatric Nurse/) and consider Patient Safety Precautions  OR  Item 6:  3 months ago or less: Immediate Notification of Physician and/or Behavioral Health and Patient Safety Precautions

## 2018-07-08 NOTE — ED AVS SNAPSHOT
[facility]    [address]    [place] [address]    Phone:  [phone]    Fax:  [phone]                                       Ally Rodas   MRN: [id number]    Department:  [facility]   Date of Visit:  7/8/2018           Patient Information     Date Of Birth          1965        Your diagnoses for this visit were:     Generalized muscle weakness       Follow-up Information     Follow up with Stephan Farris MD In 1 week.    Specialty:  Family Practice    Contact information:    [facility]  [address] [place] [address]  [phone]          Discharge Instructions       Make an appointment with your regular healthcare provider in about 1 week for follow-up.        Weakness with Uncertain Cause  Based on your exam today, the exact cause of your weakness is not certain. But your weakness does not seem to be a sign of a serious illness at this time. Keep an eye on your symptoms and get medical advice as instructed below.  Home care    Rest at home today. Don't over-exert yourself.    Take any medicine as prescribed.    For the next few days, drink extra fluids (unless your healthcare provider wants you to restrict fluids for other reasons). Don't skip meals.    Unless otherwise directed, continue to take any prescription medicines.    Contact your healthcare provider if you have any questions or concerns.  Follow-up care  Follow up with your healthcare provider, or as advised.  When to seek medical advice  Call your healthcare provider right away for any of the following:    Symptoms get worse    Symptoms don't start getting better within 2 days    Fever of 100.4  F (38  C) or higher, or as directed by your healthcare provider  Call 911  Call 911 for any of these:    Chest, arm, neck, jaw, or upper back pain    Trouble breathing    Numbness or weakness of the face, one arm, or one leg    Slurred speech, confusion,  or trouble speaking, walking, or seeing    Blood in vomit or stool (black or red color)    Loss of consciousness    Severe headache  Date Last Reviewed: 10/1/2017    4299-2442 [employer]. [address] [place] [address], [place] [address]. All rights reserved. This information is not intended as a substitute for professional medical care. Always follow your healthcare professional's instructions.          24 Hour Appointment Hotline     To schedule an appointment at [facility], please call [phone]. If you don't have a family doctor or clinic, we will help you find one. [facility] clinics are conveniently located to serve the needs of you and your family.           Review of your medicines      Our records show that you are taking the medicines listed below. If these are incorrect, please call your family doctor or clinic.        Dose / Directions Last dose taken    IBUPROFEN PO        Refills:  0        VITAMIN B 12 PO        Refills:  0                Procedures and tests performed during your visit     *UA reflex to Microscopic    Basic metabolic panel    CBC with platelets differential    EKG 12-lead, tracing only      Orders Needing Specimen Collection     None      Pending Results     No orders found from 7/6/2018 to 7/9/2018.            Pending Culture Results     No orders found from 7/6/2018 to 7/9/2018.            Pending Results Instructions     If you had any lab results that were not finalized at the time of your Discharge, you can call the ED Lab Result RN at [phone]. You will be contacted by this team for any positive Lab results or changes in treatment. The nurses are available 7 days a week from 10A to 6:30P.  You can leave a message 24 hours per day and they will return your call.        Thank you for choosing [facility]       Thank you for choosing [facility] for your care. Our goal is always to provide you with excellent care. Hearing back from our patients is one way we can continue  "to improve our services. Please take a few minutes to complete the written survey that you may receive in the mail after you visit with us. Thank you!        [employer] Information     [employer] lets you send messages to your doctor, view your test results, renew your prescriptions, schedule appointments and more. To sign up, go to [email or web address] . Click on \"Log in\" on the left side of the screen, which will take you to the Welcome page. Then click on \"Sign up Now\" on the right side of the page.     You will be asked to enter the access code listed below, as well as some personal information. Please follow the directions to create your username and password.     Your access code is: [email or web address]  Expires: 10/6/2018  3:36 PM     Your access code will  in 90 days. If you need help or a new code, please call your [facility] clinic or [phone].        Care EveryWhere ID     This is your Care EveryWhere ID. This could be used by other organizations to access your [facility] medical records  [id number]        Equal Access to Services     [facility]: Hadii ralph Amaya, wailianada monae, qaybta kaaleva yeager, juan miguel marsh. So [facility] [phone].    ATENCIÓN: Si habla español, tiene a vigil disposición servicios gratuitos de asistencia lingüística. Fantasma al [phone].    We comply with applicable federal civil rights laws and [place] laws. We do not discriminate on the basis of race, color, national origin, age, disability, sex, sexual orientation, or gender identity.            After Visit Summary       This is your record. Keep this with you and show to your community pharmacist(s) and doctor(s) at your next visit.                  "

## 2018-07-08 NOTE — DISCHARGE INSTRUCTIONS
Make an appointment with your regular healthcare provider in about 1 week for follow-up.        Weakness with Uncertain Cause  Based on your exam today, the exact cause of your weakness is not certain. But your weakness does not seem to be a sign of a serious illness at this time. Keep an eye on your symptoms and get medical advice as instructed below.  Home care    Rest at home today. Don't over-exert yourself.    Take any medicine as prescribed.    For the next few days, drink extra fluids (unless your healthcare provider wants you to restrict fluids for other reasons). Don't skip meals.    Unless otherwise directed, continue to take any prescription medicines.    Contact your healthcare provider if you have any questions or concerns.  Follow-up care  Follow up with your healthcare provider, or as advised.  When to seek medical advice  Call your healthcare provider right away for any of the following:    Symptoms get worse    Symptoms don't start getting better within 2 days    Fever of 100.4  F (38  C) or higher, or as directed by your healthcare provider  Call 911  Call 911 for any of these:    Chest, arm, neck, jaw, or upper back pain    Trouble breathing    Numbness or weakness of the face, one arm, or one leg    Slurred speech, confusion, or trouble speaking, walking, or seeing    Blood in vomit or stool (black or red color)    Loss of consciousness    Severe headache  Date Last Reviewed: 10/1/2017    1898-5213 [employer]. [address] [place] [address]. All rights reserved. This information is not intended as a substitute for professional medical care. Always follow your healthcare professional's instructions.

## 2018-08-01 ENCOUNTER — TRANSFERRED RECORDS (OUTPATIENT)
Dept: MULTI SPECIALTY CLINIC | Facility: CLINIC | Age: 53
End: 2018-08-01

## 2018-08-01 LAB
HPV ABSTRACT: NORMAL
PAP-ABSTRACT: NORMAL

## 2019-06-14 ENCOUNTER — HOSPITAL ENCOUNTER (EMERGENCY)
Facility: OTHER | Age: 54
Discharge: HOME | End: 2019-06-15
Attending: EMERGENCY MEDICINE | Admitting: EMERGENCY MEDICINE
Payer: COMMERCIAL

## 2019-06-14 DIAGNOSIS — M25.551 HIP PAIN, RIGHT: ICD-10-CM

## 2019-06-14 DIAGNOSIS — R19.7 DIARRHEA, UNSPECIFIED TYPE: ICD-10-CM

## 2019-06-14 PROBLEM — M89.9 DISORDER OF BONE AND CARTILAGE: Status: ACTIVE | Noted: 2018-08-09

## 2019-06-14 PROBLEM — G47.30 SLEEP APNEA, UNSPECIFIED TYPE: Status: ACTIVE | Noted: 2018-10-03

## 2019-06-14 PROBLEM — R73.03 PRE-DIABETES: Status: ACTIVE | Noted: 2018-07-15

## 2019-06-14 PROBLEM — M94.9 DISORDER OF BONE AND CARTILAGE: Status: ACTIVE | Noted: 2018-08-09

## 2019-06-14 PROBLEM — F32.A DEPRESSION: Status: ACTIVE | Noted: 2019-06-14

## 2019-06-14 PROBLEM — Z87.891 HISTORY OF TOBACCO ABUSE: Status: ACTIVE | Noted: 2018-07-15

## 2019-06-14 PROBLEM — E55.9 VITAMIN D INSUFFICIENCY: Status: ACTIVE | Noted: 2018-07-15

## 2019-06-14 PROCEDURE — 99284 EMERGENCY DEPT VISIT MOD MDM: CPT | Mod: 25 | Performed by: EMERGENCY MEDICINE

## 2019-06-14 PROCEDURE — 96372 THER/PROPH/DIAG INJ SC/IM: CPT | Mod: XU | Performed by: EMERGENCY MEDICINE

## 2019-06-14 PROCEDURE — 99283 EMERGENCY DEPT VISIT LOW MDM: CPT | Mod: Z6 | Performed by: EMERGENCY MEDICINE

## 2019-06-14 RX ORDER — ERGOCALCIFEROL 1.25 MG/1
50000 CAPSULE, LIQUID FILLED ORAL DAILY
COMMUNITY

## 2019-06-14 RX ORDER — FLUOXETINE 20 MG/1
30 TABLET, FILM COATED ORAL DAILY
COMMUNITY
End: 2022-08-15

## 2019-06-14 RX ORDER — ASPIRIN 81 MG/1
81 TABLET, CHEWABLE ORAL DAILY
COMMUNITY

## 2019-06-14 RX ORDER — CALCIUM CARBONATE 500(1250)
1 TABLET ORAL 2 TIMES DAILY
COMMUNITY

## 2019-06-14 ASSESSMENT — MIFFLIN-ST. JEOR: SCORE: 2111.36

## 2019-06-14 NOTE — ED AVS SNAPSHOT
[facility]  [address]  [place] [address]  Phone:  [phone]  Fax:  [phone]                                    Ally Rodas   MRN: [id number]    Department:  [facility]   Date of Visit:  6/14/2019           After Visit Summary Signature Page    I have received my discharge instructions, and my questions have been answered. I have discussed any challenges I see with this plan with the nurse or doctor.    ..........................................................................................................................................  Patient/Patient Representative Signature      ..........................................................................................................................................  Patient Representative Print Name and Relationship to Patient    ..................................................               ................................................  Date                                   Time    ..........................................................................................................................................  Reviewed by Signature/Title    ...................................................              ..............................................  Date                                               Time          22EPIC Rev 08/18

## 2019-06-15 VITALS
BODY MASS INDEX: 48.82 KG/M2 | OXYGEN SATURATION: 95 % | HEART RATE: 74 BPM | HEIGHT: 65 IN | DIASTOLIC BLOOD PRESSURE: 86 MMHG | TEMPERATURE: 97.5 F | RESPIRATION RATE: 16 BRPM | WEIGHT: 293 LBS | SYSTOLIC BLOOD PRESSURE: 147 MMHG

## 2019-06-15 LAB
ANION GAP SERPL CALCULATED.3IONS-SCNC: 8 MMOL/L (ref 3–14)
BASOPHILS # BLD AUTO: 0.1 10E9/L (ref 0–0.2)
BASOPHILS NFR BLD AUTO: 0.9 %
BUN SERPL-MCNC: 18 MG/DL (ref 7–25)
CALCIUM SERPL-MCNC: 9.4 MG/DL (ref 8.6–10.3)
CHLORIDE SERPL-SCNC: 102 MMOL/L (ref 98–107)
CO2 SERPL-SCNC: 25 MMOL/L (ref 21–31)
CREAT SERPL-MCNC: 1.01 MG/DL (ref 0.6–1.2)
DIFFERENTIAL METHOD BLD: NORMAL
EOSINOPHIL # BLD AUTO: 0.2 10E9/L (ref 0–0.7)
EOSINOPHIL NFR BLD AUTO: 3.2 %
ERYTHROCYTE [DISTWIDTH] IN BLOOD BY AUTOMATED COUNT: 14.3 % (ref 10–15)
GFR SERPL CREATININE-BSD FRML MDRD: 57 ML/MIN/{1.73_M2}
GLUCOSE SERPL-MCNC: 140 MG/DL (ref 70–105)
HCT VFR BLD AUTO: 40.6 % (ref 35–47)
HGB BLD-MCNC: 13.3 G/DL (ref 11.7–15.7)
IMM GRANULOCYTES # BLD: 0 10E9/L (ref 0–0.4)
IMM GRANULOCYTES NFR BLD: 0.3 %
LYMPHOCYTES # BLD AUTO: 2 10E9/L (ref 0.8–5.3)
LYMPHOCYTES NFR BLD AUTO: 28.4 %
MCH RBC QN AUTO: 29.6 PG (ref 26.5–33)
MCHC RBC AUTO-ENTMCNC: 32.8 G/DL (ref 31.5–36.5)
MCV RBC AUTO: 90 FL (ref 78–100)
MONOCYTES # BLD AUTO: 0.6 10E9/L (ref 0–1.3)
MONOCYTES NFR BLD AUTO: 8.5 %
NEUTROPHILS # BLD AUTO: 4.1 10E9/L (ref 1.6–8.3)
NEUTROPHILS NFR BLD AUTO: 58.7 %
PLATELET # BLD AUTO: 273 10E9/L (ref 150–450)
POTASSIUM SERPL-SCNC: 3.8 MMOL/L (ref 3.5–5.1)
RBC # BLD AUTO: 4.5 10E12/L (ref 3.8–5.2)
SODIUM SERPL-SCNC: 135 MMOL/L (ref 134–144)
WBC # BLD AUTO: 6.9 10E9/L (ref 4–11)

## 2019-06-15 PROCEDURE — 36415 COLL VENOUS BLD VENIPUNCTURE: CPT | Performed by: EMERGENCY MEDICINE

## 2019-06-15 PROCEDURE — 80048 BASIC METABOLIC PNL TOTAL CA: CPT | Performed by: EMERGENCY MEDICINE

## 2019-06-15 PROCEDURE — 85025 COMPLETE CBC W/AUTO DIFF WBC: CPT | Performed by: EMERGENCY MEDICINE

## 2019-06-15 PROCEDURE — 25000128 H RX IP 250 OP 636: Performed by: EMERGENCY MEDICINE

## 2019-06-15 RX ORDER — KETOROLAC TROMETHAMINE 30 MG/ML
60 INJECTION, SOLUTION INTRAMUSCULAR; INTRAVENOUS ONCE
Status: COMPLETED | OUTPATIENT
Start: 2019-06-15 | End: 2019-06-15

## 2019-06-15 RX ADMIN — KETOROLAC TROMETHAMINE 60 MG: 30 INJECTION, SOLUTION INTRAMUSCULAR at 00:17

## 2019-06-15 ASSESSMENT — ENCOUNTER SYMPTOMS
CHEST TIGHTNESS: 0
DYSURIA: 0
NAUSEA: 0
AGITATION: 0
FEVER: 0
ARTHRALGIAS: 1
LIGHT-HEADEDNESS: 0
SHORTNESS OF BREATH: 0
DIARRHEA: 1
CHILLS: 0
VOMITING: 0

## 2019-06-15 NOTE — ED TRIAGE NOTES
Right frontal catching hip pain, with intermittent LBP and buttock pain.  Duration for 1 week.  Difficulty walking today.  dDarrhea for 3 days, with watery, yellow, and frequent (4x/day).  No chills/fever.  Abdominal cramping and nausea prior to diarrhea.

## 2019-06-15 NOTE — ED PROVIDER NOTES
History     Chief Complaint   Patient presents with     Hip Pain     Diarrhea     HPI  Ally Rodas is a 54 year old female who is here complaining of an exacerbation of her chronic hip pain and diarrhea.  She has had problems with his hip pain for many years and they have really not been able to figure out what the cause is.  For the last week it has been acting up.  She cannot think of anything that she did to make it worse.  Today however it has been even more painful and is difficult for her to get around because of it.  Pain is right in the groin on the right side.  She is able to ambulate but it is very painful.    She is also complaining of diarrhea she did have URI type symptoms a couple of weeks ago and for the last few days has been having more diarrhea.  Today she had about 4 yellow wish loose bowel movements.  She is eating and drinking okay.  Not feeling significant lightheadedness or dizziness.  There is no bloody black or tarry stools.  No fevers or chills.    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  Next dexa scan due 8/2021.       History of tobacco abuse 07/15/2018     Priority: Medium     Pre-diabetes 07/15/2018     Priority: Medium     Vitamin D insufficiency 07/15/2018     Priority: Medium     Hip pain 12/18/2009     Priority: Medium        Past Medical History:    Past Medical History:   Diagnosis Date     Closed fracture of carpal bone      Closed fracture of shoulder girdle      Closed fracture of toe      Personal history of other medical treatment (CODE)        Past Surgical History:    Past Surgical History:   Procedure Laterality Date     EXTRACTION(S) DENTAL      No Comments Provided     GI SURGERY      Appendectomy      "HERNIA REPAIR  2004     ORTHOPEDIC SURGERY  2009    wrist left       Family History:    Family History   Problem Relation Age of Onset     Colon Cancer Father         Cancer-colon     Heart Disease Father         Heart Disease,CABG     Diabetes Paternal Aunt         Diabetes,X 2     Other - See Comments Brother         Psychiatric illness,depression     Thyroid Disease No family hx of         Thyroid Disease       Social History:  Marital Status:   [4]  Social History     Tobacco Use     Smoking status: Never Smoker     Smokeless tobacco: Never Used   Substance Use Topics     Alcohol use: No     Drug use: None        Medications:      aspirin (ASA) 81 MG chewable tablet   calcium carbonate (OS-GERARDO) 500 MG tablet   FLUoxetine 20 MG tablet   metFORMIN (GLUCOPHAGE) 500 MG tablet   vitamin D2 (ERGOCALCIFEROL) 51397 units (1250 mcg) capsule         Review of Systems   Constitutional: Negative for chills and fever.   HENT: Negative for congestion.    Eyes: Negative for visual disturbance.   Respiratory: Negative for chest tightness and shortness of breath.    Cardiovascular: Negative for chest pain.   Gastrointestinal: Positive for diarrhea. Negative for nausea and vomiting.   Genitourinary: Negative for dysuria.   Musculoskeletal: Positive for arthralgias.   Skin: Negative for rash.   Neurological: Negative for light-headedness.   Psychiatric/Behavioral: Negative for agitation.       Physical Exam   BP: (!) 133/92  Pulse: 77  Temp: 96.7  F (35.9  C)  Resp: 16  Height: 165.1 cm (5' 5\")  Weight: (!) 151 kg (333 lb)  SpO2: 99 %      Physical Exam   Constitutional: She is oriented to person, place, and time. She appears well-developed and well-nourished. No distress.   HENT:   Head: Normocephalic and atraumatic.   Eyes: Conjunctivae are normal.   Neck: Neck supple.   Cardiovascular: Normal rate, regular rhythm and normal heart sounds.   Pulmonary/Chest: Effort normal and breath sounds normal.   Abdominal: Soft. Bowel " sounds are normal.   Musculoskeletal:   She does not have any spinous process tenderness in her lumbar spine, however she is tender over the right SI joint area.  She is able to ambulate however she is limping somewhat.   Neurological: She is alert and oriented to person, place, and time.   Skin: Skin is warm and dry. She is not diaphoretic.   Psychiatric: She has a normal mood and affect. Her behavior is normal.   Nursing note and vitals reviewed.      ED Course        Procedures                 Results for orders placed or performed during the hospital encounter of 06/14/19 (from the past 24 hour(s))   CBC with platelets differential   Result Value Ref Range    WBC 6.9 4.0 - 11.0 10e9/L    RBC Count 4.50 3.8 - 5.2 10e12/L    Hemoglobin 13.3 11.7 - 15.7 g/dL    Hematocrit 40.6 35.0 - 47.0 %    MCV 90 78 - 100 fl    MCH 29.6 26.5 - 33.0 pg    MCHC 32.8 31.5 - 36.5 g/dL    RDW 14.3 10.0 - 15.0 %    Platelet Count 273 150 - 450 10e9/L    Diff Method Automated Method     % Neutrophils 58.7 %    % Lymphocytes 28.4 %    % Monocytes 8.5 %    % Eosinophils 3.2 %    % Basophils 0.9 %    % Immature Granulocytes 0.3 %    Absolute Neutrophil 4.1 1.6 - 8.3 10e9/L    Absolute Lymphocytes 2.0 0.8 - 5.3 10e9/L    Absolute Monocytes 0.6 0.0 - 1.3 10e9/L    Absolute Eosinophils 0.2 0.0 - 0.7 10e9/L    Absolute Basophils 0.1 0.0 - 0.2 10e9/L    Abs Immature Granulocytes 0.0 0 - 0.4 10e9/L   Basic metabolic panel   Result Value Ref Range    Sodium 135 134 - 144 mmol/L    Potassium 3.8 3.5 - 5.1 mmol/L    Chloride 102 98 - 107 mmol/L    Carbon Dioxide 25 21 - 31 mmol/L    Anion Gap 8 3 - 14 mmol/L    Glucose 140 (H) 70 - 105 mg/dL    Urea Nitrogen 18 7 - 25 mg/dL    Creatinine 1.01 0.60 - 1.20 mg/dL    GFR Estimate 57 (L) >60 mL/min/[1.73_m2]    GFR Estimate If Black 69 >60 mL/min/[1.73_m2]    Calcium 9.4 8.6 - 10.3 mg/dL       Medications   ketorolac (TORADOL) injection 60 mg (60 mg Intramuscular Given 6/15/19 0017)       Assessments  & Plan (with Medical Decision Making)     I have reviewed the nursing notes.    I have reviewed the findings, diagnosis, plan and need for follow up with the patient.  She has not been taking anything for the pain.  She had in the past taken a lot of ibuprofen and her provider I think was concerned about the amount that she was taking so asked her to back off of this but she has not taken anything now for some time.  She was given an injection of Toradol and that did help with her pain.  I told her it would be okay to use some ibuprofen or Tylenol sparingly at home.  She will also follow-up in clinic to discuss her hip pain with her primary provider and perhaps ask for orthopedic referral.    For her diarrhea check some basic lab work and she does not appear to be at all dehydrated.  Her white blood count is normal as well.  Most likely viral in nature.  She is unable to give us a sample for culture, however she was sent home with some containers to return a sample.  Return if worse.       Medication List      There are no discharge medications for this visit.         Final diagnoses:   Hip pain, right   Diarrhea, unspecified type       6/14/2019   [facility]     Sebas Hayes MD  06/15/19 1574

## 2019-07-08 ENCOUNTER — HOSPITAL ENCOUNTER (OUTPATIENT)
Dept: GENERAL RADIOLOGY | Facility: OTHER | Age: 54
Discharge: HOME | End: 2019-07-08
Attending: FAMILY MEDICINE | Admitting: FAMILY MEDICINE
Payer: COMMERCIAL

## 2019-07-08 ENCOUNTER — HOSPITAL ENCOUNTER (OUTPATIENT)
Dept: MRI IMAGING | Facility: OTHER | Age: 54
End: 2019-07-08
Attending: FAMILY MEDICINE
Payer: COMMERCIAL

## 2019-07-08 DIAGNOSIS — S73.191A TEAR OF RIGHT ACETABULAR LABRUM: ICD-10-CM

## 2019-07-08 PROCEDURE — A9575 INJ GADOTERATE MEGLUMI 0.1ML: HCPCS | Performed by: RADIOLOGY

## 2019-07-08 PROCEDURE — 73722 MRI JOINT OF LWR EXTR W/DYE: CPT | Mod: RT

## 2019-07-08 PROCEDURE — 25000125 ZZHC RX 250: Performed by: RADIOLOGY

## 2019-07-08 PROCEDURE — 25500064 ZZH RX 255 OP 636: Performed by: RADIOLOGY

## 2019-07-08 PROCEDURE — 27093 INJECTION FOR HIP X-RAY: CPT

## 2019-07-08 RX ORDER — GADOTERATE MEGLUMINE 376.9 MG/ML
0.1 INJECTION INTRAVENOUS ONCE
Status: COMPLETED | OUTPATIENT
Start: 2019-07-08 | End: 2019-07-08

## 2019-07-08 RX ORDER — LIDOCAINE HYDROCHLORIDE 10 MG/ML
2 INJECTION, SOLUTION EPIDURAL; INFILTRATION; INTRACAUDAL; PERINEURAL ONCE
Status: COMPLETED | OUTPATIENT
Start: 2019-07-08 | End: 2019-07-08

## 2019-07-08 RX ADMIN — GADOTERATE MEGLUMINE 0.1 ML: 376.9 INJECTION INTRAVENOUS at 15:41

## 2019-07-08 RX ADMIN — Medication 3 ML: at 15:43

## 2019-07-08 RX ADMIN — LIDOCAINE HYDROCHLORIDE 3 ML: 10 INJECTION, SOLUTION INFILTRATION; PERINEURAL at 15:42

## 2020-01-10 ENCOUNTER — APPOINTMENT (OUTPATIENT)
Dept: CT IMAGING | Facility: OTHER | Age: 55
End: 2020-01-10
Attending: PHYSICIAN ASSISTANT
Payer: COMMERCIAL

## 2020-01-10 ENCOUNTER — HOSPITAL ENCOUNTER (EMERGENCY)
Facility: OTHER | Age: 55
Discharge: HOME | End: 2020-01-10
Attending: PHYSICIAN ASSISTANT | Admitting: PHYSICIAN ASSISTANT
Payer: COMMERCIAL

## 2020-01-10 VITALS
WEIGHT: 293 LBS | RESPIRATION RATE: 16 BRPM | DIASTOLIC BLOOD PRESSURE: 72 MMHG | OXYGEN SATURATION: 97 % | SYSTOLIC BLOOD PRESSURE: 135 MMHG | BODY MASS INDEX: 48.82 KG/M2 | HEIGHT: 65 IN | HEART RATE: 67 BPM | TEMPERATURE: 98.4 F

## 2020-01-10 DIAGNOSIS — N30.90 CYSTITIS: ICD-10-CM

## 2020-01-10 DIAGNOSIS — R11.0 NAUSEA: ICD-10-CM

## 2020-01-10 LAB
ALBUMIN SERPL-MCNC: 4 G/DL (ref 3.5–5.7)
ALBUMIN UR-MCNC: NEGATIVE MG/DL
ALP SERPL-CCNC: 85 U/L (ref 34–104)
ALT SERPL W P-5'-P-CCNC: 37 U/L (ref 7–52)
ANION GAP SERPL CALCULATED.3IONS-SCNC: 7 MMOL/L (ref 3–14)
APPEARANCE UR: CLEAR
AST SERPL W P-5'-P-CCNC: 21 U/L (ref 13–39)
BASOPHILS # BLD AUTO: 0.1 10E9/L (ref 0–0.2)
BASOPHILS NFR BLD AUTO: 1.7 %
BILIRUB SERPL-MCNC: 0.4 MG/DL (ref 0.3–1)
BILIRUB UR QL STRIP: NEGATIVE
BUN SERPL-MCNC: 13 MG/DL (ref 7–25)
CALCIUM SERPL-MCNC: 9.3 MG/DL (ref 8.6–10.3)
CHLORIDE SERPL-SCNC: 103 MMOL/L (ref 98–107)
CO2 SERPL-SCNC: 28 MMOL/L (ref 21–31)
COLOR UR AUTO: YELLOW
CREAT SERPL-MCNC: 0.99 MG/DL (ref 0.6–1.2)
DIFFERENTIAL METHOD BLD: NORMAL
EOSINOPHIL # BLD AUTO: 0.1 10E9/L (ref 0–0.7)
EOSINOPHIL NFR BLD AUTO: 1.7 %
ERYTHROCYTE [DISTWIDTH] IN BLOOD BY AUTOMATED COUNT: 14.4 % (ref 10–15)
GFR SERPL CREATININE-BSD FRML MDRD: 58 ML/MIN/{1.73_M2}
GLUCOSE SERPL-MCNC: 90 MG/DL (ref 70–105)
GLUCOSE UR STRIP-MCNC: NEGATIVE MG/DL
HCT VFR BLD AUTO: 41.9 % (ref 35–47)
HGB BLD-MCNC: 13.3 G/DL (ref 11.7–15.7)
HGB UR QL STRIP: NEGATIVE
IMM GRANULOCYTES # BLD: 0 10E9/L (ref 0–0.4)
IMM GRANULOCYTES NFR BLD: 0.4 %
KETONES UR STRIP-MCNC: NEGATIVE MG/DL
LACTATE SERPL-SCNC: 0.7 MMOL/L (ref 0.5–2.2)
LEUKOCYTE ESTERASE UR QL STRIP: ABNORMAL
LIPASE SERPL-CCNC: 29 U/L (ref 11–82)
LYMPHOCYTES # BLD AUTO: 1.6 10E9/L (ref 0.8–5.3)
LYMPHOCYTES NFR BLD AUTO: 29.8 %
MCH RBC QN AUTO: 29.2 PG (ref 26.5–33)
MCHC RBC AUTO-ENTMCNC: 31.7 G/DL (ref 31.5–36.5)
MCV RBC AUTO: 92 FL (ref 78–100)
MONOCYTES # BLD AUTO: 0.5 10E9/L (ref 0–1.3)
MONOCYTES NFR BLD AUTO: 9.1 %
NEUTROPHILS # BLD AUTO: 3 10E9/L (ref 1.6–8.3)
NEUTROPHILS NFR BLD AUTO: 57.3 %
NITRATE UR QL: NEGATIVE
PH UR STRIP: 7 PH (ref 5–9)
PLATELET # BLD AUTO: 250 10E9/L (ref 150–450)
POTASSIUM SERPL-SCNC: 3.6 MMOL/L (ref 3.5–5.1)
PROT SERPL-MCNC: 7 G/DL (ref 6.4–8.9)
RBC # BLD AUTO: 4.55 10E12/L (ref 3.8–5.2)
RBC #/AREA URNS AUTO: NORMAL /HPF
SODIUM SERPL-SCNC: 138 MMOL/L (ref 134–144)
SOURCE: ABNORMAL
SP GR UR STRIP: 1.01 (ref 1–1.03)
UROBILINOGEN UR STRIP-ACNC: 0.2 EU/DL (ref 0.2–1)
WBC # BLD AUTO: 5.3 10E9/L (ref 4–11)
WBC #/AREA URNS AUTO: NORMAL /HPF

## 2020-01-10 PROCEDURE — 87086 URINE CULTURE/COLONY COUNT: CPT | Performed by: PHYSICIAN ASSISTANT

## 2020-01-10 PROCEDURE — 99284 EMERGENCY DEPT VISIT MOD MDM: CPT | Mod: Z6 | Performed by: PHYSICIAN ASSISTANT

## 2020-01-10 PROCEDURE — 83690 ASSAY OF LIPASE: CPT | Performed by: PHYSICIAN ASSISTANT

## 2020-01-10 PROCEDURE — 80053 COMPREHEN METABOLIC PANEL: CPT | Performed by: EMERGENCY MEDICINE

## 2020-01-10 PROCEDURE — 83605 ASSAY OF LACTIC ACID: CPT | Performed by: PHYSICIAN ASSISTANT

## 2020-01-10 PROCEDURE — 81001 URINALYSIS AUTO W/SCOPE: CPT | Performed by: EMERGENCY MEDICINE

## 2020-01-10 PROCEDURE — 74177 CT ABD & PELVIS W/CONTRAST: CPT | Mod: TC

## 2020-01-10 PROCEDURE — 25800030 ZZH RX IP 258 OP 636: Performed by: PHYSICIAN ASSISTANT

## 2020-01-10 PROCEDURE — 85025 COMPLETE CBC W/AUTO DIFF WBC: CPT | Performed by: PHYSICIAN ASSISTANT

## 2020-01-10 PROCEDURE — 36415 COLL VENOUS BLD VENIPUNCTURE: CPT | Performed by: EMERGENCY MEDICINE

## 2020-01-10 PROCEDURE — 96374 THER/PROPH/DIAG INJ IV PUSH: CPT | Mod: XU | Performed by: PHYSICIAN ASSISTANT

## 2020-01-10 PROCEDURE — 25500064 ZZH RX 255 OP 636: Performed by: PHYSICIAN ASSISTANT

## 2020-01-10 PROCEDURE — 25000128 H RX IP 250 OP 636: Performed by: PHYSICIAN ASSISTANT

## 2020-01-10 PROCEDURE — 99285 EMERGENCY DEPT VISIT HI MDM: CPT | Mod: 25 | Performed by: PHYSICIAN ASSISTANT

## 2020-01-10 RX ORDER — ONDANSETRON 4 MG/1
4 TABLET, ORALLY DISINTEGRATING ORAL EVERY 8 HOURS PRN
Qty: 5 TABLET | Refills: 0 | Status: SHIPPED | OUTPATIENT
Start: 2020-01-10 | End: 2022-08-15

## 2020-01-10 RX ORDER — IODIXANOL 320 MG/ML
100 INJECTION, SOLUTION INTRAVASCULAR ONCE
Status: COMPLETED | OUTPATIENT
Start: 2020-01-10 | End: 2020-01-10

## 2020-01-10 RX ORDER — ONDANSETRON 2 MG/ML
4 INJECTION INTRAMUSCULAR; INTRAVENOUS ONCE
Status: COMPLETED | OUTPATIENT
Start: 2020-01-10 | End: 2020-01-10

## 2020-01-10 RX ORDER — SULFAMETHOXAZOLE/TRIMETHOPRIM 800-160 MG
1 TABLET ORAL 2 TIMES DAILY
Qty: 14 TABLET | Refills: 0 | Status: SHIPPED | OUTPATIENT
Start: 2020-01-10 | End: 2022-08-15

## 2020-01-10 RX ORDER — SULFAMETHOXAZOLE/TRIMETHOPRIM 800-160 MG
1 TABLET ORAL 2 TIMES DAILY
Qty: 6 TABLET | Refills: 0 | Status: SHIPPED | OUTPATIENT
Start: 2020-01-10 | End: 2020-01-13

## 2020-01-10 RX ADMIN — IODIXANOL 100 ML: 320 INJECTION, SOLUTION INTRAVASCULAR at 18:30

## 2020-01-10 RX ADMIN — ONDANSETRON HYDROCHLORIDE 4 MG: 2 INJECTION, SOLUTION INTRAMUSCULAR; INTRAVENOUS at 17:53

## 2020-01-10 RX ADMIN — SODIUM CHLORIDE 1000 ML: 9 INJECTION, SOLUTION INTRAVENOUS at 17:53

## 2020-01-10 ASSESSMENT — ENCOUNTER SYMPTOMS
BACK PAIN: 0
CONFUSION: 0
CHEST TIGHTNESS: 0
VOMITING: 0
BRUISES/BLEEDS EASILY: 0
NAUSEA: 0
HEMATURIA: 0
WOUND: 0
CHILLS: 0
FEVER: 0
ADENOPATHY: 0
FATIGUE: 1
ABDOMINAL PAIN: 1
SHORTNESS OF BREATH: 0

## 2020-01-10 ASSESSMENT — MIFFLIN-ST. JEOR: SCORE: 2092.75

## 2020-01-10 NOTE — ED PROVIDER NOTES
History     Chief Complaint   Patient presents with     Abdominal Pain     HPI  Ally Rodas is a 54 year old female who presents with abdominal pain. She states that she has been having some medication adjustments with her insulin and metformin. She states he was put on a new insulin a couple months ago and has noticed generalized abdominal pain since then with diarrhea. States to also be very fatigued.  She reports her abdominal pain is located mostly in suprapubic region.  But she denies dysuria, increased urinary frequency, vaginal discharge, chest pain, shortness of breath, fevers.    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  Next dexa scan due 8/2021.       History of tobacco abuse 07/15/2018     Priority: Medium     Pre-diabetes 07/15/2018     Priority: Medium     Vitamin D insufficiency 07/15/2018     Priority: Medium     Hip pain 12/18/2009     Priority: Medium        Past Medical History:    Past Medical History:   Diagnosis Date     Closed fracture of carpal bone      Closed fracture of shoulder girdle      Closed fracture of toe      Personal history of other medical treatment (CODE)        Past Surgical History:    Past Surgical History:   Procedure Laterality Date     EXTRACTION(S) DENTAL      No Comments Provided     GI SURGERY      Appendectomy     HERNIA REPAIR  2004     ORTHOPEDIC SURGERY  2009    wrist left       Family History:    Family History   Problem Relation Age of Onset     Colon Cancer Father         Cancer-colon     Heart Disease Father         Heart Disease,CABG     Diabetes Paternal Aunt         Diabetes,X 2     Other - See Comments Brother         Psychiatric illness,depression     Thyroid Disease No family hx of        "  Thyroid Disease       Social History:  Marital Status:   [4]  Social History     Tobacco Use     Smoking status: Never Smoker     Smokeless tobacco: Never Used   Substance Use Topics     Alcohol use: No     Drug use: Not on file        Medications:    ondansetron (ZOFRAN-ODT) 4 MG ODT tab  sulfamethoxazole-trimethoprim (BACTRIM DS/SEPTRA DS) 800-160 MG tablet  sulfamethoxazole-trimethoprim (BACTRIM DS/SEPTRA DS) 800-160 MG tablet  aspirin (ASA) 81 MG chewable tablet  calcium carbonate (OS-GERARDO) 500 MG tablet  FLUoxetine 20 MG tablet  metFORMIN (GLUCOPHAGE) 500 MG tablet  vitamin D2 (ERGOCALCIFEROL) 94147 units (1250 mcg) capsule          Review of Systems   Constitutional: Positive for fatigue. Negative for chills and fever.   HENT: Negative for congestion.    Eyes: Negative for visual disturbance.   Respiratory: Negative for chest tightness and shortness of breath.    Cardiovascular: Negative for chest pain.   Gastrointestinal: Positive for abdominal pain. Negative for nausea and vomiting.   Genitourinary: Negative for hematuria.   Musculoskeletal: Negative for back pain.   Skin: Negative for rash and wound.   Neurological: Negative for syncope.   Hematological: Negative for adenopathy. Does not bruise/bleed easily.   Psychiatric/Behavioral: Negative for confusion.       Physical Exam   BP: 139/86  Pulse: 77  Temp: 98.4  F (36.9  C)  Resp: 16  Height: 165.1 cm (5' 5\")  Weight: 149.7 kg (330 lb)  SpO2: 98 %      Physical Exam  Constitutional:       General: She is not in acute distress.     Appearance: She is well-developed. She is not diaphoretic.   HENT:      Head: Normocephalic and atraumatic.   Eyes:      General: No scleral icterus.     Conjunctiva/sclera: Conjunctivae normal.   Neck:      Musculoskeletal: Neck supple.   Cardiovascular:      Rate and Rhythm: Normal rate and regular rhythm.   Pulmonary:      Effort: Pulmonary effort is normal.      Breath sounds: Normal breath sounds.   Abdominal:      " Palpations: Abdomen is soft.      Tenderness: There is abdominal tenderness in the suprapubic area.   Musculoskeletal:         General: No deformity.   Lymphadenopathy:      Cervical: No cervical adenopathy.   Skin:     General: Skin is warm and dry.      Findings: No rash.   Neurological:      General: No focal deficit present.      Mental Status: She is alert and oriented to person, place, and time.   Psychiatric:         Mood and Affect: Mood normal. Mood is not anxious or depressed.         Behavior: Behavior normal.         ED Course        Procedures               Critical Care time:  none               Results for orders placed or performed during the hospital encounter of 01/10/20 (from the past 24 hour(s))   Comprehensive metabolic panel   Result Value Ref Range    Sodium 138 134 - 144 mmol/L    Potassium 3.6 3.5 - 5.1 mmol/L    Chloride 103 98 - 107 mmol/L    Carbon Dioxide 28 21 - 31 mmol/L    Anion Gap 7 3 - 14 mmol/L    Glucose 90 70 - 105 mg/dL    Urea Nitrogen 13 7 - 25 mg/dL    Creatinine 0.99 0.60 - 1.20 mg/dL    GFR Estimate 58 (L) >60 mL/min/[1.73_m2]    GFR Estimate If Black 70 >60 mL/min/[1.73_m2]    Calcium 9.3 8.6 - 10.3 mg/dL    Bilirubin Total 0.4 0.3 - 1.0 mg/dL    Albumin 4.0 3.5 - 5.7 g/dL    Protein Total 7.0 6.4 - 8.9 g/dL    Alkaline Phosphatase 85 34 - 104 U/L    ALT 37 7 - 52 U/L    AST 21 13 - 39 U/L   CBC with platelets differential   Result Value Ref Range    WBC 5.3 4.0 - 11.0 10e9/L    RBC Count 4.55 3.8 - 5.2 10e12/L    Hemoglobin 13.3 11.7 - 15.7 g/dL    Hematocrit 41.9 35.0 - 47.0 %    MCV 92 78 - 100 fl    MCH 29.2 26.5 - 33.0 pg    MCHC 31.7 31.5 - 36.5 g/dL    RDW 14.4 10.0 - 15.0 %    Platelet Count 250 150 - 450 10e9/L    Diff Method Automated Method     % Neutrophils 57.3 %    % Lymphocytes 29.8 %    % Monocytes 9.1 %    % Eosinophils 1.7 %    % Basophils 1.7 %    % Immature Granulocytes 0.4 %    Absolute Neutrophil 3.0 1.6 - 8.3 10e9/L    Absolute Lymphocytes 1.6 0.8 -  5.3 10e9/L    Absolute Monocytes 0.5 0.0 - 1.3 10e9/L    Absolute Eosinophils 0.1 0.0 - 0.7 10e9/L    Absolute Basophils 0.1 0.0 - 0.2 10e9/L    Abs Immature Granulocytes 0.0 0 - 0.4 10e9/L   Lipase   Result Value Ref Range    Lipase 29 11 - 82 U/L   *UA reflex to Microscopic   Result Value Ref Range    Color Urine Yellow     Appearance Urine Clear     Glucose Urine Negative NEG^Negative mg/dL    Bilirubin Urine Negative NEG^Negative    Ketones Urine Negative NEG^Negative mg/dL    Specific Gravity Urine 1.015 1.000 - 1.030    Blood Urine Negative NEG^Negative    pH Urine 7.0 5.0 - 9.0 pH    Protein Albumin Urine Negative NEG^Negative mg/dL    Urobilinogen Urine 0.2 0.2 - 1.0 EU/dL    Nitrite Urine Negative NEG^Negative    Leukocyte Esterase Urine Small (A) NEG^Negative    Source Midstream Urine    Urine Microscopic   Result Value Ref Range    WBC Urine 0 - 5 OTO5^0 - 5 /HPF    RBC Urine O - 2 OTO2^O - 2 /HPF   Lactic acid   Result Value Ref Range    Lactic Acid 0.7 0.5 - 2.2 mmol/L   CT Abdomen Pelvis w Contrast    Narrative    PROCEDURE INFORMATION:   Exam: CT Abdomen And Pelvis With Contrast   Exam date and time: 1/10/2020 6:25 PM   Age: 54 years old   Clinical indication: Abdominal pain; Generalized; Prior surgery; Additional   info: See the clinical information for interpreting provider. She states that   she has been having some medication adjustments with her insulin and metformin.   She states he was put on a new insulin a couple months ago and has noticed   generalized abdominal pain since then with diarrhea. States to also be very   fatigued     TECHNIQUE:   Imaging protocol: Computed tomography of the abdomen and pelvis with   intravenous contrast.   Radiation optimization: All CT scans at this facility use at least one of these   dose optimization techniques: automated exposure control; mA and/or kV   adjustment per patient size (includes targeted exams where dose is matched to   clinical indication); or  iterative reconstruction.   Contrast material: VISIPAQUE 320; Contrast volume: 100 ml; Contrast route: IV;      COMPARISON:   CT ABDOMEN PELVIS W 12/15/2017 5:48 PM     FINDINGS:   Tubes, catheters and devices: Unremarkable.    Mediastinum: A small hiatal hernia is present.     Liver: Unremarkable. No mass. No intra or extrahepatic biliary dialtion.   Gallbladder and bile ducts: Unremarkable. No calcified stones. No ductal   dilation.   Pancreas: No evidence of pancreatitis or interval change. There is mild   irregularity of the head and body of the pancreas exclude mild pancreatitis   overall appears stable since prior examination small scattered mesenteric lymph   nodes are seen.   Spleen: Unremarkable. No splenomegaly.   Adrenals: Unremarkable. No mass.   Kidneys and ureters: Unremarkable. No hydronephrosis.   Stomach and bowel: Moderate retained content no evidence of loose fluid in the   colon.   Appendix: There has been an appendectomy.   Intraperitoneal space: Unremarkable. No free air. No significant fluid   collection.   Vasculature: No abdominal aortic aneurysm.   Lymph nodes: Small mesenteric lymph nodes are seen.     Bladder: Mild cystitis.   Reproductive: Unremarkable as visualized.   Bones/joints: Unremarkable. No acute fracture.   Soft tissues: Morbid obesity.       Impression    IMPRESSION:   1. No acute findings.   2. Mild cystitis.     THIS DOCUMENT HAS BEEN ELECTRONICALLY SIGNED BY MARIA EUGENIA JONES MD       Medications   ondansetron (ZOFRAN) injection 4 mg (4 mg Intravenous Given 1/10/20 1753)   0.9% sodium chloride BOLUS (0 mLs Intravenous Stopped 1/10/20 1919)   iodixanol (VISIPAQUE 320) injection 100 mL (100 mLs Intravenous Given 1/10/20 1830)       Assessments & Plan (with Medical Decision Making)   Pt nontoxic in NAD. Heart, lung, bowel sounds normal, abd soft, nontender to palpation, nondistended. VSS, afebrile    She has no leukocytosis, unremarkable CMP.  Urinalysis shows small leukocyte  esterase.  Lactic acid and lipase are normal.    CT scan showed, 1. No acute findings.   2. Mild cystitis.     At this time we will treat patient with short course of Bactrim and she is given Zofran.  She will follow-up with her PCP as needed.    Strict return precautions are given to the pt, they will return if symptoms are worsening or concerning. The pt understands and agrees with the plan and they are discharged.     Fran Paul PA-C          I have reviewed the nursing notes.    I have reviewed the findings, diagnosis, plan and need for follow up with the patient.       Discharge Medication List as of 1/10/2020  7:19 PM      START taking these medications    Details   ondansetron (ZOFRAN-ODT) 4 MG ODT tab Take 1 tablet (4 mg) by mouth every 8 hours as needed for nausea, Disp-5 tablet, R-0, [facility]      !! sulfamethoxazole-trimethoprim (BACTRIM DS/SEPTRA DS) 800-160 MG tablet Take 1 tablet by mouth 2 times daily for 3 days, Disp-6 tablet, R-0, [facility]      !! sulfamethoxazole-trimethoprim (BACTRIM DS/SEPTRA DS) 800-160 MG tablet Take 1 tablet by mouth 2 times daily, Disp-14 tablet, R-0, [facility]       !! - Potential duplicate medications found. Please discuss with provider.          Final diagnoses:   Cystitis   Nausea       1/10/2020   [facility]     Fran Paul PA  01/10/20 2122

## 2020-01-10 NOTE — PROGRESS NOTES
1.  Is patient currently taking metformin? yes     If NO: Technologist will give the patient normal post CT instructions.     If YES: Technologist will obtain GFR from Lab.    2.  Is GFR is greater than 60? no     If YES: Technologist will give the patient normal post CT instructions.     If NO: Technologist will give the patient METFORMIN post CT instructions.

## 2020-01-10 NOTE — ED AVS SNAPSHOT
[facility]  [address]  [place] [address]  Phone:  [phone]  Fax:  [phone]                                    Ally Rodas   MRN: [id number]    Department:  [facility]   Date of Visit:  1/10/2020           After Visit Summary Signature Page    I have received my discharge instructions, and my questions have been answered. I have discussed any challenges I see with this plan with the nurse or doctor.    ..........................................................................................................................................  Patient/Patient Representative Signature      ..........................................................................................................................................  Patient Representative Print Name and Relationship to Patient    ..................................................               ................................................  Date                                   Time    ..........................................................................................................................................  Reviewed by Signature/Title    ...................................................              ..............................................  Date                                               Time          22EPIC Rev 08/18

## 2020-01-10 NOTE — ED TRIAGE NOTES
Patient presents with abdominal pain. She states that she has been having some medication adjustments with her insulin and metformin. She states he was put on a new insulin a couple months ago and has noticed generalized abdominal pain since then with diarrhea. States to also be very fatigued. Blood Glucose: 88

## 2020-01-10 NOTE — PROGRESS NOTES
Care after an X-ray with Intravenous (IV) Contrast Media When you take Metformin or Medicine Containing Metformin        General Information:    The injection of intravenous contrast media (x-ray dye) you received on 01/10/2020  may cause a possible serious side effect because of the medicine you take to manage your diabetes.    It is recommended by the American College of Radiology that patients who take a Metformin-containing medicine (such as Glucophage, Glucovance, Metaglip or Avandamet) should stop using this medicine after an imaging procedure which involves the use of (intravenous) IV contrast.    A creatinine level should be drawn 48 hours after the procedure and should be at your baseline before you continue to taking this medicine.  Ask your health care provider for medicine instructions.        Call your Health Care Provider Today    The following instructions are important.  ? Call the health care provider who is managing your diabetes today.  Ask him or her when you can start taking your Metformin or medicine containing Metformin again.  Or, follow instructions as recommended by the Radiology Department staff.  ? Bring this information sheet with you to your Health Care Provider s office.    ? If you have any questions, please ask your regular Health Care Provider, or call us at [phone].      Patients with diabetes and is on Metformin, Glucophage, Gluovance, Metaglip or Avandament:  ? Patient needs to have creatinine drawn at clinic visit.  ? Needs order to have creatinine drawn 48 hours after test.  ? Patient is not to start taking anti-diabetes medication again until the creatinine level is normal.

## 2020-01-11 NOTE — DISCHARGE INSTRUCTIONS
Plenty of fluids and rest.  Take Tylenol ibuprofen as needed for discomfort.  Take your antibiotic as directed.  I recommend that you call your PCP early next week and get a follow-up appointment.  Return to the ED if there are worsening or concerning symptoms.

## 2020-01-12 LAB
BACTERIA SPEC CULT: NORMAL
SPECIMEN SOURCE: NORMAL

## 2020-01-12 NOTE — RESULT ENCOUNTER NOTE
Final urine culture report is NEGATIVE per [facility] Result protocol.    If NEGATIVE result, no change in treatment, per [facility] Result protocol.

## 2020-09-16 ENCOUNTER — HOSPITAL ENCOUNTER (OUTPATIENT)
Dept: MRI IMAGING | Facility: OTHER | Age: 55
Discharge: HOME | End: 2020-09-16
Attending: NURSE PRACTITIONER | Admitting: NURSE PRACTITIONER
Payer: COMMERCIAL

## 2020-09-16 DIAGNOSIS — M25.551 RIGHT HIP PAIN: ICD-10-CM

## 2020-09-16 PROCEDURE — 73721 MRI JNT OF LWR EXTRE W/O DYE: CPT | Mod: RT

## 2021-02-22 ENCOUNTER — DOCUMENTATION ONLY (OUTPATIENT)
Dept: OTHER | Facility: CLINIC | Age: 56
End: 2021-02-22

## 2022-03-12 ENCOUNTER — HOSPITAL ENCOUNTER (EMERGENCY)
Facility: OTHER | Age: 57
Discharge: HOME | End: 2022-03-12
Attending: PHYSICIAN ASSISTANT | Admitting: PHYSICIAN ASSISTANT
Payer: COMMERCIAL

## 2022-03-12 ENCOUNTER — APPOINTMENT (OUTPATIENT)
Dept: GENERAL RADIOLOGY | Facility: OTHER | Age: 57
End: 2022-03-12
Attending: PHYSICIAN ASSISTANT
Payer: COMMERCIAL

## 2022-03-12 ENCOUNTER — APPOINTMENT (OUTPATIENT)
Dept: CT IMAGING | Facility: OTHER | Age: 57
End: 2022-03-12
Attending: PHYSICIAN ASSISTANT
Payer: COMMERCIAL

## 2022-03-12 VITALS
WEIGHT: 293 LBS | OXYGEN SATURATION: 99 % | SYSTOLIC BLOOD PRESSURE: 145 MMHG | HEART RATE: 72 BPM | DIASTOLIC BLOOD PRESSURE: 77 MMHG | BODY MASS INDEX: 52.42 KG/M2 | TEMPERATURE: 98 F | RESPIRATION RATE: 17 BRPM

## 2022-03-12 DIAGNOSIS — J12.9 VIRAL PNEUMONITIS: ICD-10-CM

## 2022-03-12 DIAGNOSIS — J20.9 ACUTE BRONCHITIS, UNSPECIFIED ORGANISM: ICD-10-CM

## 2022-03-12 DIAGNOSIS — R05.9 COUGH: ICD-10-CM

## 2022-03-12 LAB
ALBUMIN SERPL-MCNC: 4.1 G/DL (ref 3.5–5.7)
ALBUMIN UR-MCNC: NEGATIVE MG/DL
ALP SERPL-CCNC: 82 U/L (ref 34–104)
ALT SERPL W P-5'-P-CCNC: 21 U/L (ref 7–52)
ANION GAP SERPL CALCULATED.3IONS-SCNC: 7 MMOL/L (ref 3–14)
APPEARANCE UR: CLEAR
AST SERPL W P-5'-P-CCNC: 16 U/L (ref 13–39)
BASOPHILS # BLD AUTO: 0 10E3/UL (ref 0–0.2)
BASOPHILS NFR BLD AUTO: 1 %
BILIRUB SERPL-MCNC: 0.5 MG/DL (ref 0.3–1)
BILIRUB UR QL STRIP: NEGATIVE
BUN SERPL-MCNC: 18 MG/DL (ref 7–25)
CALCIUM SERPL-MCNC: 9.8 MG/DL (ref 8.6–10.3)
CHLORIDE BLD-SCNC: 100 MMOL/L (ref 98–107)
CO2 SERPL-SCNC: 30 MMOL/L (ref 21–31)
COLOR UR AUTO: YELLOW
CREAT SERPL-MCNC: 1.04 MG/DL (ref 0.6–1.2)
CRP SERPL-MCNC: 7.9 MG/L
D DIMER PPP FEU-MCNC: 1.18 UG/ML FEU (ref 0–0.5)
EOSINOPHIL # BLD AUTO: 0.1 10E3/UL (ref 0–0.7)
EOSINOPHIL NFR BLD AUTO: 2 %
ERYTHROCYTE [DISTWIDTH] IN BLOOD BY AUTOMATED COUNT: 13.2 % (ref 10–15)
FLUAV RNA SPEC QL NAA+PROBE: NEGATIVE
FLUBV RNA RESP QL NAA+PROBE: NEGATIVE
GFR SERPL CREATININE-BSD FRML MDRD: 62 ML/MIN/1.73M2
GLUCOSE BLD-MCNC: 96 MG/DL (ref 70–105)
GLUCOSE UR STRIP-MCNC: NEGATIVE MG/DL
HCT VFR BLD AUTO: 40.8 % (ref 35–47)
HGB BLD-MCNC: 13.7 G/DL (ref 11.7–15.7)
HGB UR QL STRIP: NEGATIVE
IMM GRANULOCYTES # BLD: 0 10E3/UL
IMM GRANULOCYTES NFR BLD: 0 %
KETONES UR STRIP-MCNC: NEGATIVE MG/DL
LEUKOCYTE ESTERASE UR QL STRIP: ABNORMAL
LYMPHOCYTES # BLD AUTO: 1.5 10E3/UL (ref 0.8–5.3)
LYMPHOCYTES NFR BLD AUTO: 38 %
MCH RBC QN AUTO: 30.1 PG (ref 26.5–33)
MCHC RBC AUTO-ENTMCNC: 33.6 G/DL (ref 31.5–36.5)
MCV RBC AUTO: 90 FL (ref 78–100)
MONOCYTES # BLD AUTO: 0.3 10E3/UL (ref 0–1.3)
MONOCYTES NFR BLD AUTO: 7 %
MUCOUS THREADS #/AREA URNS LPF: PRESENT /LPF
NEUTROPHILS # BLD AUTO: 2 10E3/UL (ref 1.6–8.3)
NEUTROPHILS NFR BLD AUTO: 52 %
NITRATE UR QL: NEGATIVE
NRBC # BLD AUTO: 0 10E3/UL
NRBC BLD AUTO-RTO: 0 /100
NT-PROBNP SERPL-MCNC: 24 PG/ML (ref 0–100)
PH UR STRIP: 6.5 [PH] (ref 5–9)
PLATELET # BLD AUTO: 247 10E3/UL (ref 150–450)
POTASSIUM BLD-SCNC: 4.3 MMOL/L (ref 3.5–5.1)
PROT SERPL-MCNC: 7.1 G/DL (ref 6.4–8.9)
RBC # BLD AUTO: 4.55 10E6/UL (ref 3.8–5.2)
RBC URINE: <1 /HPF
RSV RNA SPEC NAA+PROBE: NEGATIVE
SARS-COV-2 RNA RESP QL NAA+PROBE: NEGATIVE
SODIUM SERPL-SCNC: 137 MMOL/L (ref 134–144)
SP GR UR STRIP: 1.03 (ref 1–1.03)
SQUAMOUS EPITHELIAL: 1 /HPF
TROPONIN I SERPL-MCNC: <2.4 PG/ML (ref 0–34)
TROPONIN I SERPL-MCNC: <2.4 PG/ML (ref 0–34)
UROBILINOGEN UR STRIP-MCNC: NORMAL MG/DL
WBC # BLD AUTO: 3.8 10E3/UL (ref 4–11)
WBC URINE: 2 /HPF

## 2022-03-12 PROCEDURE — 80053 COMPREHEN METABOLIC PANEL: CPT | Performed by: PHYSICIAN ASSISTANT

## 2022-03-12 PROCEDURE — 99285 EMERGENCY DEPT VISIT HI MDM: CPT | Mod: 25 | Performed by: PHYSICIAN ASSISTANT

## 2022-03-12 PROCEDURE — 250N000009 HC RX 250: Performed by: PHYSICIAN ASSISTANT

## 2022-03-12 PROCEDURE — 36415 COLL VENOUS BLD VENIPUNCTURE: CPT | Performed by: PHYSICIAN ASSISTANT

## 2022-03-12 PROCEDURE — 87637 SARSCOV2&INF A&B&RSV AMP PRB: CPT | Performed by: EMERGENCY MEDICINE

## 2022-03-12 PROCEDURE — 71275 CT ANGIOGRAPHY CHEST: CPT

## 2022-03-12 PROCEDURE — C9803 HOPD COVID-19 SPEC COLLECT: HCPCS | Performed by: PHYSICIAN ASSISTANT

## 2022-03-12 PROCEDURE — 82040 ASSAY OF SERUM ALBUMIN: CPT | Performed by: PHYSICIAN ASSISTANT

## 2022-03-12 PROCEDURE — 93005 ELECTROCARDIOGRAM TRACING: CPT | Performed by: PHYSICIAN ASSISTANT

## 2022-03-12 PROCEDURE — 258N000003 HC RX IP 258 OP 636: Performed by: PHYSICIAN ASSISTANT

## 2022-03-12 PROCEDURE — 85379 FIBRIN DEGRADATION QUANT: CPT | Performed by: PHYSICIAN ASSISTANT

## 2022-03-12 PROCEDURE — 94640 AIRWAY INHALATION TREATMENT: CPT

## 2022-03-12 PROCEDURE — 250N000011 HC RX IP 250 OP 636: Performed by: PHYSICIAN ASSISTANT

## 2022-03-12 PROCEDURE — 83880 ASSAY OF NATRIURETIC PEPTIDE: CPT | Performed by: PHYSICIAN ASSISTANT

## 2022-03-12 PROCEDURE — 84484 ASSAY OF TROPONIN QUANT: CPT | Performed by: PHYSICIAN ASSISTANT

## 2022-03-12 PROCEDURE — 87637 SARSCOV2&INF A&B&RSV AMP PRB: CPT | Performed by: PHYSICIAN ASSISTANT

## 2022-03-12 PROCEDURE — 999N000157 HC STATISTIC RCP TIME EA 10 MIN

## 2022-03-12 PROCEDURE — 93010 ELECTROCARDIOGRAM REPORT: CPT | Performed by: INTERNAL MEDICINE

## 2022-03-12 PROCEDURE — 71046 X-RAY EXAM CHEST 2 VIEWS: CPT

## 2022-03-12 PROCEDURE — 86140 C-REACTIVE PROTEIN: CPT | Performed by: PHYSICIAN ASSISTANT

## 2022-03-12 PROCEDURE — 85014 HEMATOCRIT: CPT | Performed by: PHYSICIAN ASSISTANT

## 2022-03-12 PROCEDURE — 99284 EMERGENCY DEPT VISIT MOD MDM: CPT | Performed by: PHYSICIAN ASSISTANT

## 2022-03-12 PROCEDURE — 81001 URINALYSIS AUTO W/SCOPE: CPT | Performed by: PHYSICIAN ASSISTANT

## 2022-03-12 RX ORDER — IOPAMIDOL 755 MG/ML
100 INJECTION, SOLUTION INTRAVASCULAR ONCE
Status: COMPLETED | OUTPATIENT
Start: 2022-03-12 | End: 2022-03-12

## 2022-03-12 RX ORDER — IPRATROPIUM BROMIDE AND ALBUTEROL SULFATE 2.5; .5 MG/3ML; MG/3ML
3 SOLUTION RESPIRATORY (INHALATION) ONCE
Status: COMPLETED | OUTPATIENT
Start: 2022-03-12 | End: 2022-03-12

## 2022-03-12 RX ORDER — PREDNISONE 20 MG/1
TABLET ORAL
Qty: 15 TABLET | Refills: 0 | Status: SHIPPED | OUTPATIENT
Start: 2022-03-12 | End: 2022-08-15

## 2022-03-12 RX ORDER — SODIUM CHLORIDE 9 MG/ML
INJECTION, SOLUTION INTRAVENOUS CONTINUOUS
Status: DISCONTINUED | OUTPATIENT
Start: 2022-03-12 | End: 2022-03-12 | Stop reason: HOSPADM

## 2022-03-12 RX ORDER — ALBUTEROL SULFATE 90 UG/1
2 AEROSOL, METERED RESPIRATORY (INHALATION) EVERY 4 HOURS PRN
Qty: 8 G | Refills: 3 | Status: SHIPPED | OUTPATIENT
Start: 2022-03-12 | End: 2022-08-15

## 2022-03-12 RX ORDER — AZITHROMYCIN 250 MG/1
TABLET, FILM COATED ORAL
Qty: 6 TABLET | Refills: 0 | Status: SHIPPED | OUTPATIENT
Start: 2022-03-12 | End: 2022-08-15

## 2022-03-12 RX ORDER — BUDESONIDE 0.5 MG/2ML
0.5 INHALANT ORAL ONCE
Status: COMPLETED | OUTPATIENT
Start: 2022-03-12 | End: 2022-03-12

## 2022-03-12 RX ADMIN — IOPAMIDOL 100 ML: 755 INJECTION, SOLUTION INTRAVENOUS at 14:36

## 2022-03-12 RX ADMIN — BUDESONIDE 0.5 MG: 0.5 INHALANT RESPIRATORY (INHALATION) at 12:37

## 2022-03-12 RX ADMIN — IPRATROPIUM BROMIDE AND ALBUTEROL SULFATE 3 ML: .5; 2.5 SOLUTION RESPIRATORY (INHALATION) at 12:37

## 2022-03-12 RX ADMIN — SODIUM CHLORIDE 1000 ML: 9 INJECTION, SOLUTION INTRAVENOUS at 13:54

## 2022-03-12 ASSESSMENT — ENCOUNTER SYMPTOMS
FLANK PAIN: 0
COUGH: 1
SHORTNESS OF BREATH: 1
SEIZURES: 0
AGITATION: 0
NAUSEA: 0
NECK PAIN: 0
CONSTIPATION: 0
TREMORS: 0
RHINORRHEA: 1
WHEEZING: 1
FACIAL SWELLING: 0
SORE THROAT: 1
VOMITING: 0
STRIDOR: 0
BACK PAIN: 0
EYE PAIN: 0
FEVER: 0
APPETITE CHANGE: 0
HEADACHES: 0
CHILLS: 0
DIARRHEA: 0
ABDOMINAL PAIN: 0

## 2022-03-12 NOTE — ED PROVIDER NOTES
"  History     Chief Complaint   Patient presents with     Shortness of Breath     Cough     Pharyngitis     HPI  Ally Rodas is a 56 year old female who has been having a sore throat, cough and runny nose as well as chest congestion and heaviness for the past week to week and a half.  She reports that today her lungs \"feel heavy\".  She has had a complete Covid series of 3 immunizations.  She reports she did a home COVID test 3 days ago and that was negative.  Denies any history of respiratory issues.  No asthma or bronchitis.  No COPD.  No current tobacco use but has used tobacco in the past..  Past medical history is significant for prediabetes, vitamin D insufficiency, sleep apnea, depression, and disorder of bone and cartilage.    Allergies:  Allergies   Allergen Reactions     Lamictal [Lamotrigine] Hives       Problem List:    Patient Active Problem List    Diagnosis Date Noted     Depression 06/14/2019     Priority: Medium     Sleep apnea, unspecified type 10/03/2018     Priority: Medium     Disorder of bone and cartilage 08/09/2018     Priority: Medium     Found on dexa scan.  Next dexa scan due 8/2021.       History of tobacco abuse 07/15/2018     Priority: Medium     Pre-diabetes 07/15/2018     Priority: Medium     Vitamin D insufficiency 07/15/2018     Priority: Medium     Hip pain 12/18/2009     Priority: Medium        Past Medical History:    Past Medical History:   Diagnosis Date     Closed fracture of carpal bone      Closed fracture of shoulder girdle      Closed fracture of toe      Personal history of other medical treatment (CODE)        Past Surgical History:    Past Surgical History:   Procedure Laterality Date     EXTRACTION(S) DENTAL      No Comments Provided     GI SURGERY      Appendectomy     HERNIA REPAIR  2004     ORTHOPEDIC SURGERY  2009    wrist left       Family History:    Family History   Problem Relation Age of Onset     Colon Cancer Father         Cancer-colon     Heart Disease " Father         Heart Disease,CABG     Diabetes Paternal Aunt         Diabetes,X 2     Other - See Comments Brother         Psychiatric illness,depression     Thyroid Disease No family hx of         Thyroid Disease       Social History:  Marital Status:   [4]  Social History     Tobacco Use     Smoking status: Never Smoker     Smokeless tobacco: Never Used   Substance Use Topics     Alcohol use: No     Drug use: Not on file        Medications:    aspirin (ASA) 81 MG chewable tablet  calcium carbonate (OS-GERARDO) 500 MG tablet  FLUoxetine 20 MG tablet  metFORMIN (GLUCOPHAGE) 500 MG tablet  ondansetron (ZOFRAN-ODT) 4 MG ODT tab  sulfamethoxazole-trimethoprim (BACTRIM DS/SEPTRA DS) 800-160 MG tablet  vitamin D2 (ERGOCALCIFEROL) 20823 units (1250 mcg) capsule          Review of Systems   Constitutional: Negative for appetite change, chills and fever.   HENT: Positive for rhinorrhea and sore throat. Negative for facial swelling.    Eyes: Negative for pain.   Respiratory: Positive for cough, shortness of breath and wheezing. Negative for stridor.    Cardiovascular: Negative for chest pain.   Gastrointestinal: Negative for abdominal pain, constipation, diarrhea, nausea and vomiting.   Genitourinary: Negative for flank pain.   Musculoskeletal: Negative for back pain and neck pain.   Skin: Negative for pallor.   Neurological: Negative for tremors, seizures and headaches.   Psychiatric/Behavioral: Negative for agitation.   All other systems reviewed and are negative.      Physical Exam   BP: (!) 146/93  Pulse: 72  Temp: 98  F (36.7  C)  Resp: 17  Weight: 142.9 kg (315 lb)  SpO2: 97 %    Vitals:    03/12/22 1112 03/12/22 1237 03/12/22 1525   BP: (!) 146/93  (!) 145/77   Pulse: 72     Resp: 17     Temp: 98  F (36.7  C)     TempSrc: Tympanic     SpO2: 97% 99%    Weight: 142.9 kg (315 lb)         Physical Exam  Vitals and nursing note reviewed.   Constitutional:       General: She is not in acute distress.     Appearance:  Normal appearance. She is not ill-appearing or toxic-appearing.   HENT:      Head: Normocephalic. No raccoon eyes, right periorbital erythema or left periorbital erythema.      Right Ear: No drainage or tenderness.      Left Ear: No drainage or tenderness.      Nose: Nose normal.   Eyes:      General: Lids are normal. Gaze aligned appropriately. No scleral icterus.     Extraocular Movements: Extraocular movements intact.   Neck:      Trachea: No tracheal deviation.   Cardiovascular:      Rate and Rhythm: Normal rate.   Pulmonary:      Effort: Pulmonary effort is normal. No respiratory distress.      Breath sounds: No stridor. Wheezing present.      Comments: Lung sounds with scattered expiratory wheezes noted to the left side.  Right side lungs are clear.  SaO2 is 97% on room air.  She is not appear to be in any respiratory distress no tachypnea.  Abdominal:      Tenderness: There is no abdominal tenderness.   Musculoskeletal:         General: No deformity or signs of injury. Normal range of motion.      Cervical back: Normal range of motion. No signs of trauma.   Skin:     General: Skin is warm and dry.      Coloration: Skin is not jaundiced or pale.   Neurological:      General: No focal deficit present.      Mental Status: She is alert and oriented to person, place, and time.      GCS: GCS eye subscore is 4. GCS verbal subscore is 5. GCS motor subscore is 6.      Motor: No tremor or seizure activity.   Psychiatric:         Attention and Perception: Attention normal.         Mood and Affect: Mood normal.         ED Course     EKG shows normal sinus rhythm with heart rate of 71.    Results for orders placed or performed during the hospital encounter of 03/12/22 (from the past 24 hour(s))   Symptomatic; Unknown Influenza A/B & SARS-CoV2 (COVID-19) Virus PCR Multiplex Nose    Specimen: Nose; Swab   Result Value Ref Range    Influenza A PCR Negative Negative    Influenza B PCR Negative Negative    RSV PCR Negative  Negative    SARS CoV2 PCR Negative Negative    Narrative    Testing was performed using the Xpert Xpress CoV2/Flu/RSV Assay on the [employer] GeneXpert Instrument. This test should be ordered for the detection of SARS-CoV-2 and influenza viruses in individuals who meet clinical and/or epidemiological criteria. Test performance is unknown in asymptomatic patients. This test is for in vitro diagnostic use under the FDA EUA for laboratories certified under CLIA to perform high or moderate complexity testing. This test has not been FDA cleared or approved. A negative result does not rule out the presence of PCR inhibitors in the specimen or target RNA in concentration below the limit of detection for the assay. If only one viral target is positive but coinfection with multiple targets is suspected, the sample should be re-tested with another FDA cleared, approved, or authorized test, if coinfection would change clinical management. This test was validated by the [facility]. These laboratories are certified under the Clinical  Laboratory Improvement Amendments of 1988 (CLIA-88) as qualified to perform high complexity laboratory testing.   XR Chest 2 Views    Narrative    PROCEDURE:  XR CHEST 2 VW    HISTORY: sob, cough, .    COMPARISON:  None.    FINDINGS:  The cardiomediastinal contours are normal. The trachea is midline.  Lung volumes are low. Penetration is limited. No focal consolidation,  effusion or pneumothorax.    No suspicious osseous lesion or subdiaphragmatic free air.      Impression    IMPRESSION:      No focal consolidation.      SKYLER ACOSTA MD         SYSTEM ID:  [id number]   CBC with platelets differential    Narrative    The following orders were created for panel order CBC with platelets differential.  Procedure                               Abnormality         Status                     ---------                               -----------         ------                     CBC  with platelets and d...[[id number]]  Abnormal            Final result                 Please view results for these tests on the individual orders.   Comprehensive metabolic panel   Result Value Ref Range    Sodium 137 134 - 144 mmol/L    Potassium 4.3 3.5 - 5.1 mmol/L    Chloride 100 98 - 107 mmol/L    Carbon Dioxide (CO2) 30 21 - 31 mmol/L    Anion Gap 7 3 - 14 mmol/L    Urea Nitrogen 18 7 - 25 mg/dL    Creatinine 1.04 0.60 - 1.20 mg/dL    Calcium 9.8 8.6 - 10.3 mg/dL    Glucose 96 70 - 105 mg/dL    Alkaline Phosphatase 82 34 - 104 U/L    AST 16 13 - 39 U/L    ALT 21 7 - 52 U/L    Protein Total 7.1 6.4 - 8.9 g/dL    Albumin 4.1 3.5 - 5.7 g/dL    Bilirubin Total 0.5 0.3 - 1.0 mg/dL    GFR Estimate 62 >60 mL/min/1.73m2   D dimer quantitative   Result Value Ref Range    D-Dimer Quantitative 1.18 (H) 0.00 - 0.50 ug/mL FEU    Narrative    This D-dimer assay is intended for use in conjunction with a clinical pretest probability assessment model to exclude pulmonary embolism (PE) and deep venous thrombosis (DVT) in outpatients suspected of PE or DVT. The cut-off value is 0.50 ug/mL FEU.   Troponin I   Result Value Ref Range    Troponin I <2.4 0.0 - 34.0 pg/mL   CRP inflammation   Result Value Ref Range    CRP Inflammation 7.9 <10.0 mg/L   Nt probnp inpatient (BNP)   Result Value Ref Range    N terminal Pro BNP Inpatient 24 0 - 100 pg/mL   CBC with platelets and differential   Result Value Ref Range    WBC Count 3.8 (L) 4.0 - 11.0 10e3/uL    RBC Count 4.55 3.80 - 5.20 10e6/uL    Hemoglobin 13.7 11.7 - 15.7 g/dL    Hematocrit 40.8 35.0 - 47.0 %    MCV 90 78 - 100 fL    MCH 30.1 26.5 - 33.0 pg    MCHC 33.6 31.5 - 36.5 g/dL    RDW 13.2 10.0 - 15.0 %    Platelet Count 247 150 - 450 10e3/uL    % Neutrophils 52 %    % Lymphocytes 38 %    % Monocytes 7 %    % Eosinophils 2 %    % Basophils 1 %    % Immature Granulocytes 0 %    NRBCs per 100 WBC 0 <1 /100    Absolute Neutrophils 2.0 1.6 - 8.3 10e3/uL    Absolute Lymphocytes 1.5  0.8 - 5.3 10e3/uL    Absolute Monocytes 0.3 0.0 - 1.3 10e3/uL    Absolute Eosinophils 0.1 0.0 - 0.7 10e3/uL    Absolute Basophils 0.0 0.0 - 0.2 10e3/uL    Absolute Immature Granulocytes 0.0 <=0.4 10e3/uL    Absolute NRBCs 0.0 10e3/uL   CT Chest Pulmonary Embolism w Contrast    Narrative    PROCEDURE:  CT CHEST PULMONARY EMBOLISM W CONTRAST.    HISTORY:  Evaluate for pulmonary embolism.  PE suspected,  low/intermediate prob, positive D-dimer    TECHNIQUE:  Initial pre-contrast  and localizer images were  obtained.  Contrast enhanced helical CT pulmonary angiography was then  performed.  Routine transaxial and post-processed (multiplanar and/or  MIP) reformations were obtained.    COMPARISON:  3/12/2022    MEDS/CONTRAST: 100 mL Isovue-370    PULMONARY CTA FINDINGS:  This is a diagnostic quality helical CT  pulmonary angiogram.  There is no acute pulmonary embolism to the  first subsegmental pulmonary artery level.    OTHER FINDINGS:      Heart size is within normal limits. There is no pericardial or pleural  effusion. The thoracic aorta and main pulmonary artery are within  normal limits in size. No abnormal thoracic adenopathy is identified.    Limited views of the upper abdomen reveal no adrenal mass or acute  process.     The central airways are unremarkable. No focal consolidation or  intrapulmonary mass is identified.    No suspicious osseous lesion is identified.      Impression    IMPRESSION:    No acute pulmonary emboli to the subsegmental level.    SKYLER ACOSTA MD         SYSTEM ID:  [id number]   Troponin I   Result Value Ref Range    Troponin I <2.4 0.0 - 34.0 pg/mL   UA with Microscopic reflex to Culture    Specimen: Urine, Clean Catch   Result Value Ref Range    Color Urine Yellow Colorless, Straw, Light Yellow, Yellow    Appearance Urine Clear Clear    Glucose Urine Negative Negative mg/dL    Bilirubin Urine Negative Negative    Ketones Urine Negative Negative mg/dL    Specific Gravity Urine  1.034 (H) 1.000 - 1.030    Blood Urine Negative Negative    pH Urine 6.5 5.0 - 9.0    Protein Albumin Urine Negative Negative mg/dL    Urobilinogen Urine Normal Normal, 2.0 mg/dL    Nitrite Urine Negative Negative    Leukocyte Esterase Urine Small (A) Negative    Mucus Urine Present (A) None Seen /LPF    RBC Urine <1 <=2 /HPF    WBC Urine 2 <=5 /HPF    Squamous Epithelials Urine 1 <=1 /HPF    Narrative    Urine Culture not indicated       Medications   ipratropium - albuterol 0.5 mg/2.5 mg/3 mL (DUONEB) neb solution 3 mL (3 mLs Nebulization Given 3/12/22 1237)   budesonide (PULMICORT) neb solution 0.5 mg (0.5 mg Nebulization Given 3/12/22 1237)   0.9% sodium chloride BOLUS (0 mLs Intravenous Stopped 3/12/22 1558)   iopamidol (ISOVUE-370) solution 100 mL (100 mLs Intravenous Given 3/12/22 1436)       Assessments & Plan (with Medical Decision Making)     I have reviewed the nursing notes.    I have reviewed the findings, diagnosis, plan and need for follow up with the patient.      Discharge Medication List as of 3/12/2022  3:58 PM      START taking these medications    Details   albuterol (PROAIR HFA/PROVENTIL HFA/VENTOLIN HFA) 108 (90 Base) MCG/ACT inhaler Inhale 2 puffs into the lungs every 4 hours as needed for shortness of breath / dyspnea or wheezing, Disp-8 g, R-3, Local Print      azithromycin (ZITHROMAX Z-QUINCY) 250 MG tablet Two tablets on the first day, then one tablet daily for the next 4 days, Disp-6 tablet, R-0, Local Print      predniSONE (DELTASONE) 20 MG tablet Take two tablets (= 40mg) each day for 5 (five) days, then one tablet (20 mg) each day x 5 days until finished, Disp-15 tablet, R-0, Local Print             Final diagnoses:   Viral pneumonitis   Acute bronchitis, unspecified organism   Cough     Afebrile.  Vital signs stable, blood pressure slightly elevated at 146/93.  Patient with a persistent cough and flulike symptoms over the past week and a half.  Had her Covid series already.  EKG shows  normal sinus rhythm with heart rate of 71.  IV established and she was given a DuoNeb and Pulmicort initially..  Troponin is normal.  BNP is normal.  CRP is normal.  Her D-dimer returns slightly elevated at 1.18.  No previous for comparison.  CBC shows slight leukopenia with her white blood cells at 3.8, but otherwise unremarkable.  CMP is normal.  Chest x-ray shows no focal consolidation.  Given the elevated D-dimer a CT PE study was performed and this shows No acute pulmonary emboli to the subsegmental level.  The patient feels that the nebulizers seem to help her considerably.  No sign of obvious pneumonia.  Most likely some early viral pneumonitis and bronchitis.  Rx for albuterol MDI and prednisone.  Rx as well for azithromycin which she will hold and fill only if she develops a fever or her symptoms worsen or persist in the next 3-5 days.  This patient was seen under the consultation and in collaboration with , The attending ER physician who agrees with the evaluation, treatment and disposition of this patient.      3/12/2022   [facility]     Dannie Pardo PA-C  03/12/22 [id number]

## 2022-03-12 NOTE — ED TRIAGE NOTES
"ED Nursing Triage Note (General)   ________________________________    Ally HERO Rodas is a 56 year old Female that presents to triage private car  With history of  Pt states that she had  Cold for 1-1/2 weeks but today her lungs \"feel heavy\", sore throat, cough, runny nose reported by patient.  Pt did a covid home test 3 days ago and that was negative.  Significant symptoms had onset 1-1/2 week(s) ago.    Patient appears alert , in no acute distress., and cooperative behavior.    GCS Eye Opening = 4=Spontaneous  Airway: intact  Breathing noted as Normal  Circulation Normal  Skin:  Normal  Action taken:  Triage to critical care immediately          "

## 2022-03-14 LAB
ATRIAL RATE - MUSE: 71 BPM
DIASTOLIC BLOOD PRESSURE - MUSE: NORMAL MMHG
INTERPRETATION ECG - MUSE: NORMAL
P AXIS - MUSE: 23 DEGREES
PR INTERVAL - MUSE: 168 MS
QRS DURATION - MUSE: 96 MS
QT - MUSE: 424 MS
QTC - MUSE: 460 MS
R AXIS - MUSE: 17 DEGREES
SYSTOLIC BLOOD PRESSURE - MUSE: NORMAL MMHG
T AXIS - MUSE: 25 DEGREES
VENTRICULAR RATE- MUSE: 71 BPM

## 2022-08-15 ENCOUNTER — OFFICE VISIT (OUTPATIENT)
Dept: FAMILY MEDICINE | Facility: OTHER | Age: 57
End: 2022-08-15
Attending: PHYSICIAN ASSISTANT
Payer: COMMERCIAL

## 2022-08-15 VITALS
WEIGHT: 293 LBS | HEART RATE: 67 BPM | BODY MASS INDEX: 54.38 KG/M2 | SYSTOLIC BLOOD PRESSURE: 134 MMHG | OXYGEN SATURATION: 97 % | TEMPERATURE: 97.3 F | DIASTOLIC BLOOD PRESSURE: 80 MMHG | RESPIRATION RATE: 16 BRPM

## 2022-08-15 DIAGNOSIS — R11.0 NAUSEA: Primary | ICD-10-CM

## 2022-08-15 DIAGNOSIS — Z80.0 FAMILY HISTORY OF COLON CANCER: ICD-10-CM

## 2022-08-15 DIAGNOSIS — E66.01 MORBID OBESITY (H): ICD-10-CM

## 2022-08-15 DIAGNOSIS — R19.7 DIARRHEA, UNSPECIFIED TYPE: ICD-10-CM

## 2022-08-15 DIAGNOSIS — R14.0 BLOATING: ICD-10-CM

## 2022-08-15 DIAGNOSIS — R73.03 PRE-DIABETES: ICD-10-CM

## 2022-08-15 LAB
ALBUMIN SERPL-MCNC: 4.1 G/DL (ref 3.5–5.7)
ALBUMIN UR-MCNC: NEGATIVE MG/DL
ALP SERPL-CCNC: 76 U/L (ref 34–104)
ALT SERPL W P-5'-P-CCNC: 32 U/L (ref 7–52)
AMYLASE SERPL-CCNC: 32 U/L (ref 29–103)
ANION GAP SERPL CALCULATED.3IONS-SCNC: 10 MMOL/L (ref 3–14)
APPEARANCE UR: CLEAR
AST SERPL W P-5'-P-CCNC: 24 U/L (ref 13–39)
BASOPHILS # BLD AUTO: 0.1 10E3/UL (ref 0–0.2)
BASOPHILS NFR BLD AUTO: 1 %
BILIRUB SERPL-MCNC: 0.5 MG/DL (ref 0.3–1)
BILIRUB UR QL STRIP: NEGATIVE
BUN SERPL-MCNC: 16 MG/DL (ref 7–25)
CALCIUM SERPL-MCNC: 9.5 MG/DL (ref 8.6–10.3)
CHLORIDE BLD-SCNC: 102 MMOL/L (ref 98–107)
CO2 SERPL-SCNC: 24 MMOL/L (ref 21–31)
COLOR UR AUTO: ABNORMAL
CREAT SERPL-MCNC: 0.94 MG/DL (ref 0.6–1.2)
EOSINOPHIL # BLD AUTO: 0.1 10E3/UL (ref 0–0.7)
EOSINOPHIL NFR BLD AUTO: 2 %
ERYTHROCYTE [DISTWIDTH] IN BLOOD BY AUTOMATED COUNT: 14.2 % (ref 10–15)
GFR SERPL CREATININE-BSD FRML MDRD: 70 ML/MIN/1.73M2
GLUCOSE BLD-MCNC: 89 MG/DL (ref 70–105)
GLUCOSE UR STRIP-MCNC: NEGATIVE MG/DL
HBA1C MFR BLD: 5.9 % (ref 4–6.2)
HCT VFR BLD AUTO: 40.9 % (ref 35–47)
HGB BLD-MCNC: 13.7 G/DL (ref 11.7–15.7)
HGB UR QL STRIP: NEGATIVE
IMM GRANULOCYTES # BLD: 0 10E3/UL
IMM GRANULOCYTES NFR BLD: 0 %
KETONES UR STRIP-MCNC: NEGATIVE MG/DL
LEUKOCYTE ESTERASE UR QL STRIP: ABNORMAL
LIPASE SERPL-CCNC: 32 U/L (ref 11–82)
LYMPHOCYTES # BLD AUTO: 1.4 10E3/UL (ref 0.8–5.3)
LYMPHOCYTES NFR BLD AUTO: 23 %
MCH RBC QN AUTO: 29.1 PG (ref 26.5–33)
MCHC RBC AUTO-ENTMCNC: 33.5 G/DL (ref 31.5–36.5)
MCV RBC AUTO: 87 FL (ref 78–100)
MONOCYTES # BLD AUTO: 0.5 10E3/UL (ref 0–1.3)
MONOCYTES NFR BLD AUTO: 8 %
MUCOUS THREADS #/AREA URNS LPF: PRESENT /LPF
NEUTROPHILS # BLD AUTO: 4 10E3/UL (ref 1.6–8.3)
NEUTROPHILS NFR BLD AUTO: 66 %
NITRATE UR QL: NEGATIVE
NRBC # BLD AUTO: 0 10E3/UL
NRBC BLD AUTO-RTO: 0 /100
PH UR STRIP: 5.5 [PH] (ref 5–9)
PLATELET # BLD AUTO: 260 10E3/UL (ref 150–450)
POTASSIUM BLD-SCNC: 3.9 MMOL/L (ref 3.5–5.1)
PROT SERPL-MCNC: 7.4 G/DL (ref 6.4–8.9)
RBC # BLD AUTO: 4.7 10E6/UL (ref 3.8–5.2)
RBC URINE: <1 /HPF
SODIUM SERPL-SCNC: 136 MMOL/L (ref 134–144)
SP GR UR STRIP: 1.01 (ref 1–1.03)
UROBILINOGEN UR STRIP-MCNC: NORMAL MG/DL
WBC # BLD AUTO: 6.1 10E3/UL (ref 4–11)
WBC URINE: 3 /HPF

## 2022-08-15 PROCEDURE — 83036 HEMOGLOBIN GLYCOSYLATED A1C: CPT | Mod: ZL | Performed by: FAMILY MEDICINE

## 2022-08-15 PROCEDURE — 85025 COMPLETE CBC W/AUTO DIFF WBC: CPT | Mod: ZL | Performed by: FAMILY MEDICINE

## 2022-08-15 PROCEDURE — 87086 URINE CULTURE/COLONY COUNT: CPT | Mod: ZL | Performed by: FAMILY MEDICINE

## 2022-08-15 PROCEDURE — G0463 HOSPITAL OUTPT CLINIC VISIT: HCPCS

## 2022-08-15 PROCEDURE — 83690 ASSAY OF LIPASE: CPT | Mod: ZL | Performed by: FAMILY MEDICINE

## 2022-08-15 PROCEDURE — 99214 OFFICE O/P EST MOD 30 MIN: CPT | Performed by: FAMILY MEDICINE

## 2022-08-15 PROCEDURE — 36415 COLL VENOUS BLD VENIPUNCTURE: CPT | Mod: ZL | Performed by: FAMILY MEDICINE

## 2022-08-15 PROCEDURE — 82150 ASSAY OF AMYLASE: CPT | Mod: ZL | Performed by: FAMILY MEDICINE

## 2022-08-15 PROCEDURE — 80053 COMPREHEN METABOLIC PANEL: CPT | Mod: ZL | Performed by: FAMILY MEDICINE

## 2022-08-15 PROCEDURE — 81003 URINALYSIS AUTO W/O SCOPE: CPT | Mod: ZL | Performed by: FAMILY MEDICINE

## 2022-08-15 RX ORDER — FLUOXETINE 40 MG/1
CAPSULE ORAL
COMMUNITY
Start: 2022-06-27

## 2022-08-15 RX ORDER — DOCUSATE SODIUM 100 MG/1
CAPSULE, LIQUID FILLED ORAL
COMMUNITY
Start: 2022-08-11

## 2022-08-15 RX ORDER — CONJUGATED ESTROGENS 0.62 MG/G
CREAM VAGINAL
COMMUNITY
Start: 2022-02-04

## 2022-08-15 RX ORDER — FLUTICASONE PROPIONATE 50 MCG
1 SPRAY, SUSPENSION (ML) NASAL
COMMUNITY
Start: 2019-01-18

## 2022-08-15 RX ORDER — CETIRIZINE HYDROCHLORIDE 10 MG/1
10 TABLET ORAL
COMMUNITY
Start: 2019-05-14

## 2022-08-15 ASSESSMENT — PAIN SCALES - GENERAL: PAINLEVEL: NO PAIN (0)

## 2022-08-15 NOTE — NURSING NOTE
Chief Complaint   Patient presents with     Gastric Problem     Upset stomach and fatigue      Here today with concerns of upset stomach and fatigue for 2 weeks.     Medication Reconciliation: complete    Ladonna Iraheta, LPN

## 2022-08-15 NOTE — PROGRESS NOTES
ASSESSMENT/PLAN:     1. Nausea    2. Morbid obesity (H)    3. Diarrhea, unspecified type    4. Bloating    5. Pre-diabetes    6. Family history of colon cancer      1.  I have personally reviewed the labs listed below.   2.  Differential diagnosis of symptoms reviewed including gastritis, pud, functional bowel disease, colon cancer, medication side effects, infection.  Will do testing for H pylori and stool bacterial testing.  After she has collected sample, she is to start omeprazole 20mg daily.  If tests negative, recommend follow up with her PCP.  3.  Pre-diabetes with adequate control.     Assessment & Plan   Problem List Items Addressed This Visit        Digestive    Morbid obesity (H)       Endocrine    Pre-diabetes    Relevant Orders    UA reflex to Microscopic and Culture (Completed)    CBC and Differential (Completed)    Comprehensive Metabolic Panel (Completed)    Amylase (Completed)    Lipase (Completed)    Hemoglobin A1c (Completed)    Urine Culture      Other Visit Diagnoses     Nausea    -  Primary    Relevant Medications    omeprazole (PRILOSEC) 20 MG DR capsule    Other Relevant Orders    UA reflex to Microscopic and Culture (Completed)    CBC and Differential (Completed)    Comprehensive Metabolic Panel (Completed)    Amylase (Completed)    Lipase (Completed)    Hemoglobin A1c (Completed)    Urine Culture    Diarrhea, unspecified type        Relevant Orders    UA reflex to Microscopic and Culture (Completed)    CBC and Differential (Completed)    Comprehensive Metabolic Panel (Completed)    Amylase (Completed)    Lipase (Completed)    Hemoglobin A1c (Completed)    Urine Culture    Helicobacter pylori Antigen Stool    Enteric Bacteria and Virus Panel by [facility] Stool    Bloating        Relevant Medications    omeprazole (PRILOSEC) 20 MG DR capsule    Other Relevant Orders    UA reflex to Microscopic and Culture (Completed)    CBC and Differential (Completed)    Comprehensive Metabolic Panel (Completed)     Amylase (Completed)    Lipase (Completed)    Hemoglobin A1c (Completed)    Urine Culture    Family history of colon cancer              Review of the result(s) of each unique test - yes         PDMP Review     None                   KAUSHAL SOSA MD, FAAFP  [facility]      NURSING NOTES:  Nursing Notes:   Ladonna Iraheta LPN  8/15/2022  3:58 PM  Sign at exiting of workspace  Chief Complaint   Patient presents with     Gastric Problem     Upset stomach and fatigue      Here today with concerns of upset stomach and fatigue for 2 weeks.     Medication Reconciliation: complete    Ladonna Iraheta LPN         SUBJECTIVE:    Ally Rodas is a 57 year old female  who presents for the following health issues:  Upset stomach    HPI  Ally Rodas is a 57 year old female presents for evaluation of upset stomach   She has been sick to her stomach, moaning sick.  She's had diarrhea with this a couple of times.  (usually more constipated)    It is light colored, light brown.    No blood or black in her stool.    Sore throat - this has been coming and going for a few weeks.    No fever.    A little bit of coughing, shallow cough.    Brain fog - out of it feeling.      She hasn't eaten anything today.  Otherwise, only eating small amts.    Hasn't vomited.    Doesn't eat milk or gluten.      She has pre-diabetes   Glucophage tried and victoza tried years ago and she didn't tolerate.    :  Denies dysuria but has chronic groin pain.      She has tried not taking her medications in the morning - this may have helped a bit.  Tried drinking more water.  Took pepto one day - helped some.        Has had her appendix removed.  +FH of colon cancer - has had a colonoscopy done     She hasn't lost weight.       Allergies   Allergen Reactions     Lamictal [Lamotrigine] Hives     Current Outpatient Medications   Medication     aspirin (ASA) 81 MG chewable tablet     calcium carbonate (OS-GERARDO) 500 MG  tablet     cetirizine (ZYRTEC) 10 MG tablet     diclofenac (VOLTAREN) 1 % topical gel     docusate sodium (COLACE) 100 MG capsule     FLUoxetine (PROZAC) 40 MG capsule     fluticasone (FLONASE) 50 MCG/ACT nasal spray     omeprazole (PRILOSEC) 20 MG DR capsule     PREMARIN 0.625 MG/GM vaginal cream     vitamin D2 (ERGOCALCIFEROL) 02416 units (1250 mcg) capsule     No current facility-administered medications for this visit.      Past Medical History:   Diagnosis Date     Closed fracture of carpal bone     left     Closed fracture of shoulder girdle     left     Closed fracture of toe     No Comments Provided     Personal history of other medical treatment (CODE)      2, para 2 (vaginal childbirths)     Uncertain date of colonoscopy in the past     Review of Systems     PHQ-2 Score:     No flowsheet data found.      No flowsheet data found.  No flowsheet data found.        OBJECTIVE:     Objective  /80 (BP Location: Right arm, Patient Position: Sitting, Cuff Size: Adult Regular)   Pulse 67   Temp 97.3  F (36.3  C) (Tympanic)   Resp 16   Wt 148.2 kg (326 lb 12.8 oz)   SpO2 97%   Breastfeeding No   BMI 54.38 kg/m   Body mass index is 54.38 kg/m .    Wt Readings from Last 4 Encounters:   08/15/22 148.2 kg (326 lb 12.8 oz)   22 142.9 kg (315 lb)   01/10/20 149.7 kg (330 lb)   19 (!) 151 kg (333 lb)       Nursing notes and VS reviewed    Physical Exam   GENERAL: healthy, alert, no distress and mildly anxious   RESP: lungs clear to auscultation - no rales, rhonchi or wheezes  CV: regular rates and rhythm and no murmur, click or rub  ABDOMEN: soft, mild epigastric tenderness; increased tympani         Results for orders placed or performed in visit on 08/15/22   Comprehensive Metabolic Panel     Status: Normal   Result Value Ref Range    Sodium 136 134 - 144 mmol/L    Potassium 3.9 3.5 - 5.1 mmol/L    Chloride 102 98 - 107 mmol/L    Carbon Dioxide (CO2) 24 21 - 31 mmol/L    Anion Gap 10 3 - 14  mmol/L    Urea Nitrogen 16 7 - 25 mg/dL    Creatinine 0.94 0.60 - 1.20 mg/dL    Calcium 9.5 8.6 - 10.3 mg/dL    Glucose 89 70 - 105 mg/dL    Alkaline Phosphatase 76 34 - 104 U/L    AST 24 13 - 39 U/L    ALT 32 7 - 52 U/L    Protein Total 7.4 6.4 - 8.9 g/dL    Albumin 4.1 3.5 - 5.7 g/dL    Bilirubin Total 0.5 0.3 - 1.0 mg/dL    GFR Estimate 70 >60 mL/min/1.73m2   Amylase     Status: Normal   Result Value Ref Range    Amylase 32 29 - 103 U/L   Lipase     Status: Normal   Result Value Ref Range    Lipase 32 11 - 82 U/L   Hemoglobin A1c     Status: Normal   Result Value Ref Range    Hemoglobin A1C 5.9 4.0 - 6.2 %   CBC with platelets and differential     Status: None   Result Value Ref Range    WBC Count 6.1 4.0 - 11.0 10e3/uL    RBC Count 4.70 3.80 - 5.20 10e6/uL    Hemoglobin 13.7 11.7 - 15.7 g/dL    Hematocrit 40.9 35.0 - 47.0 %    MCV 87 78 - 100 fL    MCH 29.1 26.5 - 33.0 pg    MCHC 33.5 31.5 - 36.5 g/dL    RDW 14.2 10.0 - 15.0 %    Platelet Count 260 150 - 450 10e3/uL    % Neutrophils 66 %    % Lymphocytes 23 %    % Monocytes 8 %    % Eosinophils 2 %    % Basophils 1 %    % Immature Granulocytes 0 %    NRBCs per 100 WBC 0 <1 /100    Absolute Neutrophils 4.0 1.6 - 8.3 10e3/uL    Absolute Lymphocytes 1.4 0.8 - 5.3 10e3/uL    Absolute Monocytes 0.5 0.0 - 1.3 10e3/uL    Absolute Eosinophils 0.1 0.0 - 0.7 10e3/uL    Absolute Basophils 0.1 0.0 - 0.2 10e3/uL    Absolute Immature Granulocytes 0.0 <=0.4 10e3/uL    Absolute NRBCs 0.0 10e3/uL   UA reflex to Microscopic and Culture     Status: Abnormal    Specimen: Urine, Midstream   Result Value Ref Range    Color Urine Light Yellow Colorless, Straw, Light Yellow, Yellow    Appearance Urine Clear Clear    Glucose Urine Negative Negative mg/dL    Bilirubin Urine Negative Negative    Ketones Urine Negative Negative mg/dL    Specific Gravity Urine 1.013 1.000 - 1.030    Blood Urine Negative Negative    pH Urine 5.5 5.0 - 9.0    Protein Albumin Urine Negative Negative mg/dL     Urobilinogen Urine Normal Normal, 2.0 mg/dL    Nitrite Urine Negative Negative    Leukocyte Esterase Urine Moderate (A) Negative    Mucus Urine Present (A) None Seen /LPF    RBC Urine <1 <=2 /HPF    WBC Urine 3 <=5 /HPF    Narrative    Urine Culture ordered based on laboratory criteria   CBC and Differential     Status: None    Narrative    The following orders were created for panel order CBC and Differential.  Procedure                               Abnormality         Status                     ---------                               -----------         ------                     CBC with platelets and d...[[id number]]                      Final result                 Please view results for these tests on the individual orders.

## 2022-08-15 NOTE — PATIENT INSTRUCTIONS
Results for orders placed or performed in visit on 08/15/22   Comprehensive Metabolic Panel     Status: Normal   Result Value Ref Range    Sodium 136 134 - 144 mmol/L    Potassium 3.9 3.5 - 5.1 mmol/L    Chloride 102 98 - 107 mmol/L    Carbon Dioxide (CO2) 24 21 - 31 mmol/L    Anion Gap 10 3 - 14 mmol/L    Urea Nitrogen 16 7 - 25 mg/dL    Creatinine 0.94 0.60 - 1.20 mg/dL    Calcium 9.5 8.6 - 10.3 mg/dL    Glucose 89 70 - 105 mg/dL    Alkaline Phosphatase 76 34 - 104 U/L    AST 24 13 - 39 U/L    ALT 32 7 - 52 U/L    Protein Total 7.4 6.4 - 8.9 g/dL    Albumin 4.1 3.5 - 5.7 g/dL    Bilirubin Total 0.5 0.3 - 1.0 mg/dL    GFR Estimate 70 >60 mL/min/1.73m2   Amylase     Status: Normal   Result Value Ref Range    Amylase 32 29 - 103 U/L   Lipase     Status: Normal   Result Value Ref Range    Lipase 32 11 - 82 U/L   Hemoglobin A1c     Status: Normal   Result Value Ref Range    Hemoglobin A1C 5.9 4.0 - 6.2 %   CBC with platelets and differential     Status: None   Result Value Ref Range    WBC Count 6.1 4.0 - 11.0 10e3/uL    RBC Count 4.70 3.80 - 5.20 10e6/uL    Hemoglobin 13.7 11.7 - 15.7 g/dL    Hematocrit 40.9 35.0 - 47.0 %    MCV 87 78 - 100 fL    MCH 29.1 26.5 - 33.0 pg    MCHC 33.5 31.5 - 36.5 g/dL    RDW 14.2 10.0 - 15.0 %    Platelet Count 260 150 - 450 10e3/uL    % Neutrophils 66 %    % Lymphocytes 23 %    % Monocytes 8 %    % Eosinophils 2 %    % Basophils 1 %    % Immature Granulocytes 0 %    NRBCs per 100 WBC 0 <1 /100    Absolute Neutrophils 4.0 1.6 - 8.3 10e3/uL    Absolute Lymphocytes 1.4 0.8 - 5.3 10e3/uL    Absolute Monocytes 0.5 0.0 - 1.3 10e3/uL    Absolute Eosinophils 0.1 0.0 - 0.7 10e3/uL    Absolute Basophils 0.1 0.0 - 0.2 10e3/uL    Absolute Immature Granulocytes 0.0 <=0.4 10e3/uL    Absolute NRBCs 0.0 10e3/uL   UA reflex to Microscopic and Culture     Status: Abnormal    Specimen: Urine, Midstream   Result Value Ref Range    Color Urine Light Yellow Colorless, Straw, Light Yellow, Yellow     Appearance Urine Clear Clear    Glucose Urine Negative Negative mg/dL    Bilirubin Urine Negative Negative    Ketones Urine Negative Negative mg/dL    Specific Gravity Urine 1.013 1.000 - 1.030    Blood Urine Negative Negative    pH Urine 5.5 5.0 - 9.0    Protein Albumin Urine Negative Negative mg/dL    Urobilinogen Urine Normal Normal, 2.0 mg/dL    Nitrite Urine Negative Negative    Leukocyte Esterase Urine Moderate (A) Negative    Mucus Urine Present (A) None Seen /LPF    RBC Urine <1 <=2 /HPF    WBC Urine 3 <=5 /HPF    Narrative    Urine Culture ordered based on laboratory criteria   CBC and Differential     Status: None    Narrative    The following orders were created for panel order CBC and Differential.  Procedure                               Abnormality         Status                     ---------                               -----------         ------                     CBC with platelets and d...[[id number]]                      Final result                 Please view results for these tests on the individual orders.

## 2022-08-16 ENCOUNTER — LAB (OUTPATIENT)
Dept: LAB | Facility: OTHER | Age: 57
End: 2022-08-16
Attending: FAMILY MEDICINE
Payer: COMMERCIAL

## 2022-08-16 DIAGNOSIS — R19.7 DIARRHEA, UNSPECIFIED TYPE: ICD-10-CM

## 2022-08-16 LAB — H PYLORI AG STL QL IA: NEGATIVE

## 2022-08-16 PROCEDURE — 87506 IADNA-DNA/RNA PROBE TQ 6-11: CPT | Mod: ZL

## 2022-08-16 PROCEDURE — 87338 HPYLORI STOOL AG IA: CPT | Mod: ZL

## 2022-08-17 LAB
BACTERIA UR CULT: NORMAL
C COLI+JEJUNI+LARI FUSA STL QL NAA+PROBE: NOT DETECTED
EC STX1 GENE STL QL NAA+PROBE: NOT DETECTED
EC STX2 GENE STL QL NAA+PROBE: NOT DETECTED
NOROV GI+II ORF1-ORF2 JNC STL QL NAA+PR: NOT DETECTED
RVA NSP5 STL QL NAA+PROBE: NOT DETECTED
SALMONELLA SP RPOD STL QL NAA+PROBE: NOT DETECTED
SHIGELLA SP+EIEC IPAH STL QL NAA+PROBE: NOT DETECTED
V CHOL+PARA RFBL+TRKH+TNAA STL QL NAA+PR: NOT DETECTED
Y ENTERO RECN STL QL NAA+PROBE: NOT DETECTED

## 2024-03-05 ENCOUNTER — HOSPITAL ENCOUNTER (EMERGENCY)
Facility: OTHER | Age: 59
Discharge: HOME | End: 2024-03-06
Attending: FAMILY MEDICINE | Admitting: FAMILY MEDICINE
Payer: COMMERCIAL

## 2024-03-05 DIAGNOSIS — R07.89 CHEST PRESSURE: ICD-10-CM

## 2024-03-05 LAB
ALBUMIN SERPL BCG-MCNC: 4.2 G/DL (ref 3.5–5.2)
ALBUMIN UR-MCNC: NEGATIVE MG/DL
ALP SERPL-CCNC: 104 U/L (ref 40–150)
ALT SERPL W P-5'-P-CCNC: 34 U/L (ref 0–50)
ANION GAP SERPL CALCULATED.3IONS-SCNC: 11 MMOL/L (ref 7–15)
APPEARANCE UR: CLEAR
AST SERPL W P-5'-P-CCNC: 26 U/L (ref 0–45)
BACTERIA #/AREA URNS HPF: ABNORMAL /HPF
BILIRUB SERPL-MCNC: 0.3 MG/DL
BILIRUB UR QL STRIP: NEGATIVE
BUN SERPL-MCNC: 33.8 MG/DL (ref 8–23)
CALCIUM SERPL-MCNC: 9.7 MG/DL (ref 8.6–10)
CHLORIDE SERPL-SCNC: 102 MMOL/L (ref 98–107)
COLOR UR AUTO: ABNORMAL
CREAT SERPL-MCNC: 1.33 MG/DL (ref 0.51–0.95)
DEPRECATED HCO3 PLAS-SCNC: 26 MMOL/L (ref 22–29)
EGFRCR SERPLBLD CKD-EPI 2021: 46 ML/MIN/1.73M2
ERYTHROCYTE [DISTWIDTH] IN BLOOD BY AUTOMATED COUNT: 14.9 % (ref 10–15)
FLUAV RNA SPEC QL NAA+PROBE: NEGATIVE
FLUBV RNA RESP QL NAA+PROBE: NEGATIVE
GLUCOSE SERPL-MCNC: 104 MG/DL (ref 70–99)
GLUCOSE UR STRIP-MCNC: NEGATIVE MG/DL
HCT VFR BLD AUTO: 41.9 % (ref 35–47)
HGB BLD-MCNC: 13.7 G/DL (ref 11.7–15.7)
HGB UR QL STRIP: NEGATIVE
HOLD SPECIMEN: NORMAL
KETONES UR STRIP-MCNC: NEGATIVE MG/DL
LEUKOCYTE ESTERASE UR QL STRIP: ABNORMAL
MCH RBC QN AUTO: 28.5 PG (ref 26.5–33)
MCHC RBC AUTO-ENTMCNC: 32.7 G/DL (ref 31.5–36.5)
MCV RBC AUTO: 87 FL (ref 78–100)
MUCOUS THREADS #/AREA URNS LPF: PRESENT /LPF
NITRATE UR QL: NEGATIVE
PH UR STRIP: 6 [PH] (ref 5–9)
PLATELET # BLD AUTO: 214 10E3/UL (ref 150–450)
POTASSIUM SERPL-SCNC: 3.8 MMOL/L (ref 3.4–5.3)
PROT SERPL-MCNC: 7.4 G/DL (ref 6.4–8.3)
RBC # BLD AUTO: 4.8 10E6/UL (ref 3.8–5.2)
RBC URINE: 1 /HPF
RSV RNA SPEC NAA+PROBE: NEGATIVE
SARS-COV-2 RNA RESP QL NAA+PROBE: NEGATIVE
SODIUM SERPL-SCNC: 139 MMOL/L (ref 135–145)
SP GR UR STRIP: 1.03 (ref 1–1.03)
SQUAMOUS EPITHELIAL: 8 /HPF
UROBILINOGEN UR STRIP-MCNC: NORMAL MG/DL
WBC # BLD AUTO: 6.9 10E3/UL (ref 4–11)
WBC URINE: 41 /HPF

## 2024-03-05 PROCEDURE — 81001 URINALYSIS AUTO W/SCOPE: CPT | Performed by: FAMILY MEDICINE

## 2024-03-05 PROCEDURE — 84484 ASSAY OF TROPONIN QUANT: CPT | Performed by: FAMILY MEDICINE

## 2024-03-05 PROCEDURE — 87637 SARSCOV2&INF A&B&RSV AMP PRB: CPT | Performed by: FAMILY MEDICINE

## 2024-03-05 PROCEDURE — 85027 COMPLETE CBC AUTOMATED: CPT | Performed by: FAMILY MEDICINE

## 2024-03-05 PROCEDURE — 99285 EMERGENCY DEPT VISIT HI MDM: CPT | Mod: 25 | Performed by: FAMILY MEDICINE

## 2024-03-05 PROCEDURE — 80053 COMPREHEN METABOLIC PANEL: CPT | Performed by: FAMILY MEDICINE

## 2024-03-05 PROCEDURE — 93005 ELECTROCARDIOGRAM TRACING: CPT | Performed by: FAMILY MEDICINE

## 2024-03-05 PROCEDURE — 36415 COLL VENOUS BLD VENIPUNCTURE: CPT | Performed by: FAMILY MEDICINE

## 2024-03-05 PROCEDURE — 87086 URINE CULTURE/COLONY COUNT: CPT | Performed by: FAMILY MEDICINE

## 2024-03-05 PROCEDURE — 99283 EMERGENCY DEPT VISIT LOW MDM: CPT | Performed by: FAMILY MEDICINE

## 2024-03-05 ASSESSMENT — COLUMBIA-SUICIDE SEVERITY RATING SCALE - C-SSRS
6. HAVE YOU EVER DONE ANYTHING, STARTED TO DO ANYTHING, OR PREPARED TO DO ANYTHING TO END YOUR LIFE?: NO
1. IN THE PAST MONTH, HAVE YOU WISHED YOU WERE DEAD OR WISHED YOU COULD GO TO SLEEP AND NOT WAKE UP?: NO
2. HAVE YOU ACTUALLY HAD ANY THOUGHTS OF KILLING YOURSELF IN THE PAST MONTH?: NO

## 2024-03-05 ASSESSMENT — ACTIVITIES OF DAILY LIVING (ADL): ADLS_ACUITY_SCORE: 33

## 2024-03-06 ENCOUNTER — APPOINTMENT (OUTPATIENT)
Dept: GENERAL RADIOLOGY | Facility: OTHER | Age: 59
End: 2024-03-06
Attending: FAMILY MEDICINE
Payer: COMMERCIAL

## 2024-03-06 VITALS
WEIGHT: 293 LBS | OXYGEN SATURATION: 98 % | HEART RATE: 70 BPM | TEMPERATURE: 96.9 F | BODY MASS INDEX: 54.25 KG/M2 | DIASTOLIC BLOOD PRESSURE: 92 MMHG | RESPIRATION RATE: 18 BRPM | SYSTOLIC BLOOD PRESSURE: 142 MMHG

## 2024-03-06 LAB — TROPONIN T SERPL HS-MCNC: <6 NG/L

## 2024-03-06 PROCEDURE — 71045 X-RAY EXAM CHEST 1 VIEW: CPT | Mod: TC

## 2024-03-06 PROCEDURE — 93010 ELECTROCARDIOGRAM REPORT: CPT | Performed by: INTERNAL MEDICINE

## 2024-03-06 ASSESSMENT — ACTIVITIES OF DAILY LIVING (ADL)
ADLS_ACUITY_SCORE: 35

## 2024-03-06 ASSESSMENT — ENCOUNTER SYMPTOMS
NAUSEA: 0
CHEST TIGHTNESS: 0
VOMITING: 0
DIARRHEA: 0
COUGH: 0
FEVER: 0
PALPITATIONS: 0
SHORTNESS OF BREATH: 1

## 2024-03-06 NOTE — ED TRIAGE NOTES
Patient presents with SOB for 1 week. States she was around someone with influenza. She is awake alert and oriented no signs of distress in triage. Vss. Pain chronic 7/10. Took no home medications denies fevers.      Triage Assessment (Adult)       Row Name 03/05/24 2035          Triage Assessment    Airway WDL WDL        Respiratory WDL    Respiratory WDL X;rhythm/pattern     Rhythm/Pattern, Respiratory shortness of breath        Skin Circulation/Temperature WDL    Skin Circulation/Temperature WDL WDL        Cardiac WDL    Cardiac WDL WDL        Peripheral/Neurovascular WDL    Peripheral Neurovascular WDL WDL        Cognitive/Neuro/Behavioral WDL    Cognitive/Neuro/Behavioral WDL WDL

## 2024-03-06 NOTE — DISCHARGE INSTRUCTIONS
Your x-ray was negative for any findings.  There is a little bit of a elevated hemidiaphragm on the right.  This is likely been there for quite some time.  It is likely not causing you any significant symptoms.  You do not have influenza, RSV or COVID.  Your lab work was otherwise reassuring and unremarkable.  EKG and heart enzyme were also within normal range.  I think it is safe for you to discharge home at this time.  Please follow-up with your regular doctor in the next 1 to 2 weeks.  If you are having increasing symptoms such as worsening shortness of breath, inability to breeze, wheezing, high fever or other new symptoms please return to the emergency room.

## 2024-03-06 NOTE — ED PROVIDER NOTES
History     Chief Complaint   Patient presents with    Shortness of Breath     HPI  Ally Rodas is a 58 year old female who presents with sensation of chest heaviness.  She notes that one of her families was diagnosed with influenza A recently.  She thought maybe she had this.  However, her test here was negative.  She describes it as a chest heaviness that is substernal.  Is nonradiating.  She has not had a cough.  No fevers or chills.  No nausea or vomiting.  No leg swelling.  No prior history of clotting disorder.  Symptoms make her feel like it is hard to catch her breath.    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  Next dexa scan due 8/2021.      History of tobacco abuse 07/15/2018     Priority: Medium    Pre-diabetes 07/15/2018     Priority: Medium    Vitamin D insufficiency 07/15/2018     Priority: Medium    Hip pain 12/18/2009     Priority: Medium        Past Medical History:    Past Medical History:   Diagnosis Date    Closed fracture of carpal bone     Closed fracture of shoulder girdle     Closed fracture of toe     Personal history of other medical treatment (CODE)        Past Surgical History:    Past Surgical History:   Procedure Laterality Date    COLONOSCOPY  02/21/2018    Saige Obrien MD - [facility]    EXTRACTION(S) DENTAL      No Comments Provided    GI SURGERY      Appendectomy    HERNIA REPAIR  2004    ORTHOPEDIC SURGERY  2009    wrist left       Family History:    Family History   Problem Relation Age of Onset    Colon Cancer Father         Cancer-colon    Heart Disease Father         Heart Disease,CABG    Diabetes  Paternal Aunt         Diabetes,X 2    Other - See Comments Brother         Psychiatric illness,depression    Thyroid Disease No family hx of         Thyroid Disease       Social History:  Marital Status:   [4]  Social History     Tobacco Use    Smoking status: Never    Smokeless tobacco: Never   Substance Use Topics    Alcohol use: No        Medications:    aspirin (ASA) 81 MG chewable tablet  calcium carbonate (OS-GREARDO) 500 MG tablet  cetirizine (ZYRTEC) 10 MG tablet  diclofenac (VOLTAREN) 1 % topical gel  docusate sodium (COLACE) 100 MG capsule  FLUoxetine (PROZAC) 40 MG capsule  fluticasone (FLONASE) 50 MCG/ACT nasal spray  omeprazole (PRILOSEC) 20 MG DR capsule  PREMARIN 0.625 MG/GM vaginal cream  vitamin D2 (ERGOCALCIFEROL) 41126 units (1250 mcg) capsule          Review of Systems   Constitutional:  Negative for fever.   Respiratory:  Positive for shortness of breath. Negative for cough and chest tightness.    Cardiovascular:  Negative for chest pain, palpitations and leg swelling.   Gastrointestinal:  Negative for diarrhea, nausea and vomiting.       Physical Exam   BP: (!) 154/93  Pulse: 84  Temp: 96.9  F (36.1  C)  Resp: 18  Weight: 147.9 kg (326 lb)  SpO2: 97 %      Physical Exam  Constitutional:       General: She is not in acute distress.     Appearance: Normal appearance. She is well-developed. She is obese. She is not diaphoretic.   HENT:      Head: Normocephalic and atraumatic.      Mouth/Throat:      Mouth: Mucous membranes are moist.   Eyes:      General: No scleral icterus.     Conjunctiva/sclera: Conjunctivae normal.      Pupils: Pupils are equal, round, and reactive to light.   Cardiovascular:      Rate and Rhythm: Normal rate and regular rhythm.      Heart sounds: Normal heart sounds.   Pulmonary:      Effort: No respiratory distress.      Breath sounds: Normal breath sounds. No decreased breath sounds, wheezing, rhonchi or rales.   Abdominal:      General: Abdomen is flat.    Musculoskeletal:      Cervical back: Neck supple.   Skin:     General: Skin is warm.      Capillary Refill: Capillary refill takes less than 2 seconds.      Findings: No rash.   Neurological:      General: No focal deficit present.      Mental Status: She is alert.         ED Course     ED Course as of 03/06/24 0402   Wed Mar 06, 2024   0031 Patient with influenza contact about 1 week ago.  She has had sensation of chest heaviness since that time.  Influenza AB RSV and COVID-negative here.  Troponin negative.   0034 EKG normal sinus rhythm.     Procedures              EKG Interpretation:      Interpreted by Yasmin Urbina DO  Time reviewed:   Symptoms at time of EKG:    Rhythm: normal sinus   Rate: normal  Axis: normal  Ectopy: none  Conduction: normal  ST Segments/ T Waves: No ST-T wave changes  Q Waves: none  Comparison to prior: Unchanged    Clinical Impression: normal EKG      Critical Care time:  none               Results for orders placed or performed during the hospital encounter of 03/05/24 (from the past 24 hour(s))   Symptomatic Influenza A/B, RSV, & SARS-CoV2 PCR (COVID-19) Nose    Specimen: Nose; Swab   Result Value Ref Range    Influenza A PCR Negative Negative    Influenza B PCR Negative Negative    RSV PCR Negative Negative    SARS CoV2 PCR Negative Negative    Narrative    Testing was performed using the Xpert Xpress CoV2/Flu/RSV Assay on the [employer] GeneXpert Instrument. This test should be ordered for the detection of SARS-CoV-2, influenza, and RSV viruses in individuals who meet clinical and/or epidemiological criteria. Test performance is unknown in asymptomatic patients. This test is for in vitro diagnostic use under the FDA EUA for laboratories certified under CLIA to perform high or moderate complexity testing. This test has not been FDA cleared or approved. A negative result does not rule out the presence of PCR inhibitors in the specimen or target RNA in concentration below the limit of  detection for the assay. If only one viral target is positive but coinfection with multiple targets is suspected, the sample should be re-tested with another FDA cleared, approved, or authorized test, if coinfection would change clinical management. This test was validated by the [facility]. These laboratories are certified under the Clinical Laboratory Improvement Amendments of 1988 (CLIA-88) as qualified to perform high complexity laboratory testing.   UA Macroscopic with reflex to Microscopic and Culture    Specimen: Urine, Clean Catch   Result Value Ref Range    Color Urine Light Yellow Colorless, Straw, Light Yellow, Yellow    Appearance Urine Clear Clear    Glucose Urine Negative Negative mg/dL    Bilirubin Urine Negative Negative    Ketones Urine Negative Negative mg/dL    Specific Gravity Urine 1.027 1.000 - 1.030    Blood Urine Negative Negative    pH Urine 6.0 5.0 - 9.0    Protein Albumin Urine Negative Negative mg/dL    Urobilinogen Urine Normal Normal, 2.0 mg/dL    Nitrite Urine Negative Negative    Leukocyte Esterase Urine Moderate (A) Negative    Bacteria Urine Few (A) None Seen /HPF    Mucus Urine Present (A) None Seen /LPF    RBC Urine 1 <=2 /HPF    WBC Urine 41 (H) <=5 /HPF    Squamous Epithelials Urine 8 (H) <=1 /HPF    Narrative    Urine Culture ordered based on laboratory criteria   [place] Draw    Narrative    The following orders were created for panel order [place] Draw.  Procedure                               Abnormality         Status                     ---------                               -----------         ------                     Extra Blue Top Tube[[id number]]                              Final result               Extra Red Top Tube[[id number]]                               Final result               Extra Green Top (Lithium...[[id number]]                      Final result               Extra Purple Top Tube[[id number]]                            Final result                Extra Green Top (Lithium...[[id number]]                      Final result                 Please view results for these tests on the individual orders.   Extra Blue Top Tube   Result Value Ref Range    Hold Specimen JIC    Extra Red Top Tube   Result Value Ref Range    Hold Specimen JIC    Extra Green Top (Lithium Heparin) Tube   Result Value Ref Range    Hold Specimen JIC    Extra Purple Top Tube   Result Value Ref Range    Hold Specimen JIC    Extra Green Top (Lithium Heparin) ON ICE   Result Value Ref Range    Hold Specimen JIC    Comprehensive metabolic panel   Result Value Ref Range    Sodium 139 135 - 145 mmol/L    Potassium 3.8 3.4 - 5.3 mmol/L    Carbon Dioxide (CO2) 26 22 - 29 mmol/L    Anion Gap 11 7 - 15 mmol/L    Urea Nitrogen 33.8 (H) 8.0 - 23.0 mg/dL    Creatinine 1.33 (H) 0.51 - 0.95 mg/dL    GFR Estimate 46 (L) >60 mL/min/1.73m2    Calcium 9.7 8.6 - 10.0 mg/dL    Chloride 102 98 - 107 mmol/L    Glucose 104 (H) 70 - 99 mg/dL    Alkaline Phosphatase 104 40 - 150 U/L    AST 26 0 - 45 U/L    ALT 34 0 - 50 U/L    Protein Total 7.4 6.4 - 8.3 g/dL    Albumin 4.2 3.5 - 5.2 g/dL    Bilirubin Total 0.3 <=1.2 mg/dL   Troponin T, High Sensitivity   Result Value Ref Range    Troponin T, High Sensitivity <6 <=14 ng/L   CBC with platelets   Result Value Ref Range    WBC Count 6.9 4.0 - 11.0 10e3/uL    RBC Count 4.80 3.80 - 5.20 10e6/uL    Hemoglobin 13.7 11.7 - 15.7 g/dL    Hematocrit 41.9 35.0 - 47.0 %    MCV 87 78 - 100 fL    MCH 28.5 26.5 - 33.0 pg    MCHC 32.7 31.5 - 36.5 g/dL    RDW 14.9 10.0 - 15.0 %    Platelet Count 214 150 - 450 10e3/uL   XR Chest Port 1 View    Narrative    PROCEDURE INFORMATION:   Exam: XR Chest   Exam date and time: 3/6/2024 1:01 AM   Age: 58 years old   Clinical indication: Shortness of breath; Additional info: SOB     TECHNIQUE:   Imaging protocol: Radiologic exam of the chest.   Views: 1 view.     COMPARISON:   CT CHEST PULMONARY EMBOLISM W CONTRAST 3/12/2022 2:16 PM      FINDINGS:   Lungs: Right lower lobe atelectasis noted. Lungs are otherwise clear.   Pleural spaces: Unremarkable. No pleural effusion. No pneumothorax.   Heart/Mediastinum: Unremarkable. No cardiomegaly.   Diaphragm: Elevated right hemidiaphragm.   Bones/joints: Unremarkable.       Impression    IMPRESSION:   No radiographic evidence of active cardiopulmonary disease.    THIS DOCUMENT HAS BEEN ELECTRONICALLY SIGNED BY TONE POMPA MD       Medications - No data to display    Assessments & Plan (with Medical Decision Making)     I have reviewed the nursing notes.    I have reviewed the findings, diagnosis, plan and need for follow up with the patient.           Medical Decision Making  The patient's presentation was of straightforward complexity (a clearly self-limited or minor problem).    The patient's evaluation involved:  ordering and/or review of 3+ test(s) in this encounter (see separate area of note for details)    The patient's management necessitated only low risk treatment.        Discharge Medication List as of 3/6/2024  3:13 AM          Final diagnoses:   Chest pressure   EKG showed normal sinus rhythm.  Cardiac enzymes negative x 2.  Chest x-ray was normal.  Other lab work unremarkable.  Influenza AB RSV and COVID are all negative.  Vital signs were stable.  At this time I think it is safe for her to discharge home.  Recommend close follow-up with PCP for ongoing management of her comorbidities.  All questions answered.    3/5/2024   [facility]       Yasmin Urbina DO  03/06/24 0402

## 2024-03-07 LAB
ATRIAL RATE - MUSE: 73 BPM
DIASTOLIC BLOOD PRESSURE - MUSE: NORMAL MMHG
INTERPRETATION ECG - MUSE: NORMAL
P AXIS - MUSE: 23 DEGREES
PR INTERVAL - MUSE: 188 MS
QRS DURATION - MUSE: 96 MS
QT - MUSE: 412 MS
QTC - MUSE: 453 MS
R AXIS - MUSE: 18 DEGREES
SYSTOLIC BLOOD PRESSURE - MUSE: NORMAL MMHG
T AXIS - MUSE: 28 DEGREES
VENTRICULAR RATE- MUSE: 73 BPM

## 2024-03-08 LAB — BACTERIA UR CULT: NO GROWTH

## 2025-07-06 ENCOUNTER — HOSPITAL ENCOUNTER (EMERGENCY)
Facility: OTHER | Age: 60
Discharge: HOME | End: 2025-07-06
Attending: EMERGENCY MEDICINE
Payer: COMMERCIAL

## 2025-07-06 VITALS
SYSTOLIC BLOOD PRESSURE: 136 MMHG | HEIGHT: 65 IN | DIASTOLIC BLOOD PRESSURE: 85 MMHG | HEART RATE: 72 BPM | OXYGEN SATURATION: 97 % | TEMPERATURE: 97.3 F | WEIGHT: 293 LBS | BODY MASS INDEX: 48.82 KG/M2 | RESPIRATION RATE: 18 BRPM

## 2025-07-06 DIAGNOSIS — Z03.818 ENCOUNTER FOR PATIENT CONCERN ABOUT EXPOSURE TO INFECTIOUS ORGANISM: ICD-10-CM

## 2025-07-06 PROCEDURE — 99282 EMERGENCY DEPT VISIT SF MDM: CPT | Performed by: STUDENT IN AN ORGANIZED HEALTH CARE EDUCATION/TRAINING PROGRAM

## 2025-07-06 ASSESSMENT — ACTIVITIES OF DAILY LIVING (ADL)
ADLS_ACUITY_SCORE: 41
ADLS_ACUITY_SCORE: 41

## 2025-07-06 ASSESSMENT — COLUMBIA-SUICIDE SEVERITY RATING SCALE - C-SSRS
2. HAVE YOU ACTUALLY HAD ANY THOUGHTS OF KILLING YOURSELF IN THE PAST MONTH?: NO
1. IN THE PAST MONTH, HAVE YOU WISHED YOU WERE DEAD OR WISHED YOU COULD GO TO SLEEP AND NOT WAKE UP?: NO
6. HAVE YOU EVER DONE ANYTHING, STARTED TO DO ANYTHING, OR PREPARED TO DO ANYTHING TO END YOUR LIFE?: NO

## 2025-07-06 NOTE — DISCHARGE INSTRUCTIONS
Have your cat evaluated by   Continue to monitor for symptoms.  Continue your home medications as previously prescribed.  Sorry for you and your cat...  Have a good week.

## 2025-07-06 NOTE — ED TRIAGE NOTES
"ED Nursing Triage Note (General)   ________________________________    Ally Rodas is a 60 year old Female that presents to triage via private vehicle with complaints of fluid exposure.  Patient states her cat had been missing for a couple weeks and states she found her a couple weeks ago at which time cat was noted to have wounds and is foaming at the mouth.  Patient states she was cleaning the cats wounds as she believes her cat was in a fight with a raccoon.  Patient states she has not been scratched or bit by her cat since her cat has returned, however, is concerned that her cat may have rabies. Patient states she has not brought her cat into the vet to be examined since she returned.   Significant symptoms had onset 3 week(s) ago.  Vital signs:  Temp: 97.2  F (36.2  C) Temp src: Tympanic BP: (!) 141/83 Pulse: 73   Resp: 20 SpO2: 96 %     Height: 165.1 cm (5' 5\") Weight: (!) 147.9 kg (326 lb)  Estimated body mass index is 54.25 kg/m  as calculated from the following:    Height as of this encounter: 1.651 m (5' 5\").    Weight as of this encounter: 147.9 kg (326 lb).    PRE HOSPITAL PRIOR LIVING SITUATION Alone      Triage Assessment (Adult)       Row Name 07/06/25 1409          Triage Assessment    Airway WDL WDL        Respiratory WDL    Respiratory WDL WDL        Skin Circulation/Temperature WDL    Skin Circulation/Temperature WDL WDL        Cardiac WDL    Cardiac WDL WDL     Cardiac Rhythm NSR        Peripheral/Neurovascular WDL    Peripheral Neurovascular WDL WDL     Capillary Refill, General less than/equal to 3 secs        Cognitive/Neuro/Behavioral WDL    Cognitive/Neuro/Behavioral WDL WDL        Renetta Coma Scale    Best Eye Response 4-->(E4) spontaneous     Best Motor Response 6-->(M6) obeys commands     Best Verbal Response 5-->(V5) oriented     Renetta Coma Scale Score 15                     "

## 2025-07-06 NOTE — ED PROVIDER NOTES
History     Chief Complaint   Patient presents with    Body Fluid Exposure     HPI  60 year old female with depression, obesity, .  She presents today after caring for her house cat that got out and was lost for nearly 3 weeks.   On it's return about 10-11 days ago found the cat to be wounded. She cleaned all wounds and has been monitoring it's progress and it has not been eating and less active than usual. It has not been aggressive or lashed out. She has not sustained any cut, scratch or bite from the animal. She is unsure if cat has licked her on face at any time. She believes the cat may have gotten into fight with another animal. Not sure what kind. Cat has not had updated rabies vaccination.   Patient seen in room #10 with daughter.    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  Next dexa scan due 8/2021.      History of tobacco abuse 07/15/2018     Priority: Medium    Pre-diabetes 07/15/2018     Priority: Medium    Vitamin D insufficiency 07/15/2018     Priority: Medium    Hip pain 12/18/2009     Priority: Medium        Past Medical History:    Past Medical History:   Diagnosis Date    Closed fracture of carpal bone     Closed fracture of shoulder girdle     Closed fracture of toe     Personal history of other medical treatment (CODE)        Past Surgical History:    Past Surgical History:   Procedure Laterality Date    COLONOSCOPY  02/21/2018    Saige Obrien MD [facility]    EXTRACTION(S) DENTAL      No Comments Provided    GI SURGERY      Appendectomy    HERNIA REPAIR  2004    ORTHOPEDIC SURGERY  2009    wrist left       Family  "History:    Family History   Problem Relation Age of Onset    Colon Cancer Father         Cancer-colon    Heart Disease Father         Heart Disease,CABG    Diabetes Paternal Aunt         Diabetes,X 2    Other - See Comments Brother         Psychiatric illness,depression    Thyroid Disease No family hx of         Thyroid Disease       Social History:  Marital Status:   [4]  Social History     Tobacco Use    Smoking status: Never    Smokeless tobacco: Never   Substance Use Topics    Alcohol use: No        Medications:    aspirin (ASA) 81 MG chewable tablet  calcium carbonate (OS-GERARDO) 500 MG tablet  cetirizine (ZYRTEC) 10 MG tablet  diclofenac (VOLTAREN) 1 % topical gel  docusate sodium (COLACE) 100 MG capsule  FLUoxetine (PROZAC) 40 MG capsule  fluticasone (FLONASE) 50 MCG/ACT nasal spray  omeprazole (PRILOSEC) 20 MG DR capsule  PREMARIN 0.625 MG/GM vaginal cream  vitamin D2 (ERGOCALCIFEROL) 11572 units (1250 mcg) capsule          Review of Systems   All other systems reviewed and are negative.      Physical Exam   BP: (!) 141/83  Pulse: 73  Temp: 97.2  F (36.2  C)  Resp: 20  Height: 165.1 cm (5' 5\")  Weight: (!) 147.9 kg (326 lb)  SpO2: 96 %      Physical Exam  Vitals and nursing note reviewed. Exam conducted with a chaperone present.   Constitutional:       Appearance: Normal appearance. She is obese. She is not ill-appearing or toxic-appearing.   HENT:      Head: Normocephalic.   Pulmonary:      Effort: Pulmonary effort is normal.   Musculoskeletal:         General: Normal range of motion.      Cervical back: Normal range of motion.   Skin:     General: Skin is warm and dry.   Neurological:      General: No focal deficit present.      Mental Status: She is alert and oriented to person, place, and time.   Psychiatric:         Mood and Affect: Mood normal.         Behavior: Behavior normal.         Thought Content: Thought content normal.         Judgment: Judgment normal.         ED Course      "   Procedures             No results found for this or any previous visit (from the past 24 hours).    Medications - No data to display    Assessments & Plan (with Medical Decision Making)     I have reviewed the nursing notes.    I have reviewed the findings, diagnosis, plan and need for follow up with the patient.  60-year-old female presents with discussion for possible rabies exposure when her home cat was lost for 3 weeks and then returned.  Did have injuries and she was able to for the cat.  There was a concern that possibly she was exposed to some blood or saliva but no evident bite or scratch noted.  Cat has progressively acted more lethargic and has not had updated rabies vaccination.  Ally has not had any symptoms of weakness, headache, fevers or chills.  Denies any type of rash.  We discussed at length the risks and benefits of rabies vaccine and immunoglobulin without any current symptoms.  She does state that she had a significant allergic reaction to her previous vaccine and would like to wait on this with understanding of these risk and benefits.  With shared decision making we were able to establish that she was going to have the cat assessed by her  tomorrow and follow-up accordingly based on transpires with the cat.  Patient does appear to understand discharge instructions as well as return precautions.        New Prescriptions    No medications on file       Final diagnoses:   Encounter for patient concern about exposure to infectious organism       7/6/2025   [facility]       Jeff Albrecht DO  07/06/25 2011

## (undated) RX ORDER — SODIUM CHLORIDE 9 MG/ML
INJECTION, SOLUTION INTRAVENOUS
Status: DISPENSED
Start: 2020-01-10

## (undated) RX ORDER — BUDESONIDE 0.5 MG/2ML
INHALANT ORAL
Status: DISPENSED
Start: 2022-03-12

## (undated) RX ORDER — LIDOCAINE HYDROCHLORIDE 10 MG/ML
INJECTION, SOLUTION INFILTRATION; PERINEURAL
Status: DISPENSED
Start: 2019-07-08

## (undated) RX ORDER — IPRATROPIUM BROMIDE AND ALBUTEROL SULFATE 2.5; .5 MG/3ML; MG/3ML
SOLUTION RESPIRATORY (INHALATION)
Status: DISPENSED
Start: 2022-03-12

## (undated) RX ORDER — SODIUM CHLORIDE 9 MG/ML
INJECTION, SOLUTION INTRAVENOUS
Status: DISPENSED
Start: 2018-07-08

## (undated) RX ORDER — SODIUM CHLORIDE 9 MG/ML
INJECTION, SOLUTION INTRAVENOUS
Status: DISPENSED
Start: 2022-03-12

## (undated) RX ORDER — ONDANSETRON 2 MG/ML
INJECTION INTRAMUSCULAR; INTRAVENOUS
Status: DISPENSED
Start: 2020-01-10

## (undated) RX ORDER — KETOROLAC TROMETHAMINE 30 MG/ML
INJECTION, SOLUTION INTRAMUSCULAR; INTRAVENOUS
Status: DISPENSED
Start: 2019-06-15